# Patient Record
Sex: MALE | Race: WHITE | NOT HISPANIC OR LATINO | Employment: OTHER | URBAN - METROPOLITAN AREA
[De-identification: names, ages, dates, MRNs, and addresses within clinical notes are randomized per-mention and may not be internally consistent; named-entity substitution may affect disease eponyms.]

---

## 2019-10-10 ENCOUNTER — APPOINTMENT (OUTPATIENT)
Dept: GENERAL RADIOLOGY | Facility: HOSPITAL | Age: 79
End: 2019-10-10

## 2019-10-10 ENCOUNTER — APPOINTMENT (OUTPATIENT)
Dept: CT IMAGING | Facility: HOSPITAL | Age: 79
End: 2019-10-10

## 2019-10-10 ENCOUNTER — HOSPITAL ENCOUNTER (EMERGENCY)
Facility: HOSPITAL | Age: 79
Discharge: HOME | End: 2019-10-10
Attending: EMERGENCY MEDICINE | Admitting: EMERGENCY MEDICINE

## 2019-10-10 VITALS
DIASTOLIC BLOOD PRESSURE: 84 MMHG | TEMPERATURE: 98.4 F | HEART RATE: 65 BPM | SYSTOLIC BLOOD PRESSURE: 149 MMHG | OXYGEN SATURATION: 98 % | BODY MASS INDEX: 31.39 KG/M2 | WEIGHT: 200 LBS | HEIGHT: 67 IN | RESPIRATION RATE: 16 BRPM

## 2019-10-10 DIAGNOSIS — R53.1 WEAKNESS: ICD-10-CM

## 2019-10-10 DIAGNOSIS — E86.0 DEHYDRATION: Primary | ICD-10-CM

## 2019-10-10 LAB
ALBUMIN SERPL-MCNC: 4.4 G/DL (ref 3.5–5.2)
ALBUMIN/GLOB SERPL: 1.5 G/DL
ALP SERPL-CCNC: 70 U/L (ref 39–117)
ALT SERPL W P-5'-P-CCNC: 16 U/L (ref 1–41)
ANION GAP SERPL CALCULATED.3IONS-SCNC: 10 MMOL/L (ref 5–15)
AST SERPL-CCNC: 17 U/L (ref 1–40)
BASOPHILS # BLD AUTO: 0.08 10*3/MM3 (ref 0–0.2)
BASOPHILS NFR BLD AUTO: 0.8 % (ref 0–1.5)
BILIRUB SERPL-MCNC: 0.4 MG/DL (ref 0.2–1.2)
BILIRUB UR QL STRIP: NEGATIVE
BUN BLD-MCNC: 17 MG/DL (ref 8–23)
BUN/CREAT SERPL: 11.1 (ref 7–25)
CALCIUM SPEC-SCNC: 9.5 MG/DL (ref 8.6–10.5)
CHLORIDE SERPL-SCNC: 103 MMOL/L (ref 98–107)
CLARITY UR: CLEAR
CO2 SERPL-SCNC: 31 MMOL/L (ref 22–29)
COLOR UR: YELLOW
CREAT BLD-MCNC: 1.53 MG/DL (ref 0.76–1.27)
D DIMER PPP FEU-MCNC: 1.3 MCGFEU/ML (ref 0–0.56)
DEPRECATED RDW RBC AUTO: 43.2 FL (ref 37–54)
EOSINOPHIL # BLD AUTO: 0.46 10*3/MM3 (ref 0–0.4)
EOSINOPHIL NFR BLD AUTO: 4.6 % (ref 0.3–6.2)
ERYTHROCYTE [DISTWIDTH] IN BLOOD BY AUTOMATED COUNT: 13.5 % (ref 12.3–15.4)
GFR SERPL CREATININE-BSD FRML MDRD: 44 ML/MIN/1.73
GLOBULIN UR ELPH-MCNC: 3 GM/DL
GLUCOSE BLD-MCNC: 134 MG/DL (ref 65–99)
GLUCOSE UR STRIP-MCNC: NEGATIVE MG/DL
HCT VFR BLD AUTO: 46 % (ref 37.5–51)
HGB BLD-MCNC: 15 G/DL (ref 13–17.7)
HGB UR QL STRIP.AUTO: NEGATIVE
HOLD SPECIMEN: NORMAL
HOLD SPECIMEN: NORMAL
IMM GRANULOCYTES # BLD AUTO: 0.05 10*3/MM3 (ref 0–0.05)
IMM GRANULOCYTES NFR BLD AUTO: 0.5 % (ref 0–0.5)
KETONES UR QL STRIP: NEGATIVE
LEUKOCYTE ESTERASE UR QL STRIP.AUTO: NEGATIVE
LYMPHOCYTES # BLD AUTO: 1.09 10*3/MM3 (ref 0.7–3.1)
LYMPHOCYTES NFR BLD AUTO: 10.8 % (ref 19.6–45.3)
MCH RBC QN AUTO: 28.4 PG (ref 26.6–33)
MCHC RBC AUTO-ENTMCNC: 32.6 G/DL (ref 31.5–35.7)
MCV RBC AUTO: 87.1 FL (ref 79–97)
MONOCYTES # BLD AUTO: 0.65 10*3/MM3 (ref 0.1–0.9)
MONOCYTES NFR BLD AUTO: 6.5 % (ref 5–12)
NEUTROPHILS # BLD AUTO: 7.72 10*3/MM3 (ref 1.7–7)
NEUTROPHILS NFR BLD AUTO: 76.8 % (ref 42.7–76)
NITRITE UR QL STRIP: NEGATIVE
NRBC BLD AUTO-RTO: 0 /100 WBC (ref 0–0.2)
NT-PROBNP SERPL-MCNC: 358.8 PG/ML (ref 5–1800)
PH UR STRIP.AUTO: 5.5 [PH] (ref 5–8)
PLATELET # BLD AUTO: 217 10*3/MM3 (ref 140–450)
PMV BLD AUTO: 10.5 FL (ref 6–12)
POTASSIUM BLD-SCNC: 4 MMOL/L (ref 3.5–5.2)
PROT SERPL-MCNC: 7.4 G/DL (ref 6–8.5)
PROT UR QL STRIP: NEGATIVE
RBC # BLD AUTO: 5.28 10*6/MM3 (ref 4.14–5.8)
SODIUM BLD-SCNC: 144 MMOL/L (ref 136–145)
SP GR UR STRIP: 1.02 (ref 1–1.03)
TROPONIN T SERPL-MCNC: <0.01 NG/ML (ref 0–0.03)
TROPONIN T SERPL-MCNC: <0.01 NG/ML (ref 0–0.03)
UROBILINOGEN UR QL STRIP: NORMAL
WBC NRBC COR # BLD: 10.05 10*3/MM3 (ref 3.4–10.8)
WHOLE BLOOD HOLD SPECIMEN: NORMAL
WHOLE BLOOD HOLD SPECIMEN: NORMAL

## 2019-10-10 PROCEDURE — 81003 URINALYSIS AUTO W/O SCOPE: CPT | Performed by: NURSE PRACTITIONER

## 2019-10-10 PROCEDURE — 85379 FIBRIN DEGRADATION QUANT: CPT | Performed by: NURSE PRACTITIONER

## 2019-10-10 PROCEDURE — 99284 EMERGENCY DEPT VISIT MOD MDM: CPT

## 2019-10-10 PROCEDURE — 84484 ASSAY OF TROPONIN QUANT: CPT | Performed by: EMERGENCY MEDICINE

## 2019-10-10 PROCEDURE — 80053 COMPREHEN METABOLIC PANEL: CPT | Performed by: EMERGENCY MEDICINE

## 2019-10-10 PROCEDURE — 71045 X-RAY EXAM CHEST 1 VIEW: CPT

## 2019-10-10 PROCEDURE — 71275 CT ANGIOGRAPHY CHEST: CPT

## 2019-10-10 PROCEDURE — 74177 CT ABD & PELVIS W/CONTRAST: CPT

## 2019-10-10 PROCEDURE — 0 IOPAMIDOL PER 1 ML: Performed by: EMERGENCY MEDICINE

## 2019-10-10 PROCEDURE — 85025 COMPLETE CBC W/AUTO DIFF WBC: CPT | Performed by: EMERGENCY MEDICINE

## 2019-10-10 PROCEDURE — 83880 ASSAY OF NATRIURETIC PEPTIDE: CPT | Performed by: EMERGENCY MEDICINE

## 2019-10-10 PROCEDURE — 93005 ELECTROCARDIOGRAM TRACING: CPT | Performed by: EMERGENCY MEDICINE

## 2019-10-10 RX ORDER — SODIUM CHLORIDE 9 MG/ML
125 INJECTION, SOLUTION INTRAVENOUS CONTINUOUS
Status: DISCONTINUED | OUTPATIENT
Start: 2019-10-10 | End: 2019-10-10

## 2019-10-10 RX ORDER — SODIUM CHLORIDE 0.9 % (FLUSH) 0.9 %
10 SYRINGE (ML) INJECTION AS NEEDED
Status: DISCONTINUED | OUTPATIENT
Start: 2019-10-10 | End: 2019-10-10 | Stop reason: HOSPADM

## 2019-10-10 RX ADMIN — SODIUM CHLORIDE 1000 ML: 9 INJECTION, SOLUTION INTRAVENOUS at 16:48

## 2019-10-10 RX ADMIN — IOPAMIDOL 99 ML: 755 INJECTION, SOLUTION INTRAVENOUS at 19:08

## 2019-10-10 NOTE — ED PROVIDER NOTES
Subjective   Elver Montenegro is an 79 y.o. male who presents to the ED with complaints of generalized weakness. The patient reports that for the past 2 months, he has been experiencing fatigue, generalized weakness, slight shortness of breath, and decreased appetite. He also complains of allergy symptoms such as cough and rhinorrhea. The patient's daughter reports that at the patient's baseline he is very active and lives alone. He denies fever, chills, chest pain, difficulty urinating, abdominal pain, and leg swelling. He has a history of hypertension. There are no other acute complaints at this time.         History provided by:  Patient  Weakness - Generalized   Severity:  Moderate  Chronicity:  New  Associated symptoms: cough and shortness of breath    Associated symptoms: no abdominal pain, no chest pain and no fever        Review of Systems   Constitutional: Positive for appetite change and fatigue. Negative for chills and fever.   HENT: Positive for rhinorrhea.    Respiratory: Positive for cough and shortness of breath.    Cardiovascular: Negative for chest pain and leg swelling.   Gastrointestinal: Negative for abdominal pain.   Genitourinary: Negative for difficulty urinating.   Neurological: Positive for weakness.   All other systems reviewed and are negative.      Past Medical History:   Diagnosis Date   • Hypertension    • Stroke (CMS/HCC)        Allergies   Allergen Reactions   • Atenolol    • Avapro [Irbesartan]    • Clonidine Derivatives    • Lisinopril    • Nifedipine    • Tetanus Toxoids        Past Surgical History:   Procedure Laterality Date   • CHOLECYSTECTOMY     • JOINT REPLACEMENT         History reviewed. No pertinent family history.    Social History     Socioeconomic History   • Marital status:      Spouse name: Not on file   • Number of children: Not on file   • Years of education: Not on file   • Highest education level: Not on file   Tobacco Use   • Smoking status: Never Smoker    Substance and Sexual Activity   • Alcohol use: No   • Drug use: No   • Sexual activity: Defer         Objective   Physical Exam   Constitutional: He is oriented to person, place, and time. He appears well-developed and well-nourished. He is cooperative.  Non-toxic appearance. He does not appear ill.   HENT:   Head: Normocephalic and atraumatic.   Mouth/Throat: Oropharynx is clear and moist. No oropharyngeal exudate or posterior oropharyngeal edema.   Eyes: Conjunctivae, EOM and lids are normal. Pupils are equal, round, and reactive to light.   Neck: Trachea normal, normal range of motion and full passive range of motion without pain. Neck supple.   Cardiovascular: Regular rhythm, normal heart sounds, intact distal pulses and normal pulses.   Pulmonary/Chest: Effort normal and breath sounds normal. No respiratory distress. He has no decreased breath sounds. He has no wheezes. He has no rhonchi. He has no rales.   Abdominal: Soft. Normal appearance and bowel sounds are normal. There is no tenderness.   Musculoskeletal: Normal range of motion.        Right lower leg: Normal. He exhibits no tenderness and no edema.        Left lower leg: Normal. He exhibits no tenderness and no edema.   Neurological: He is alert and oriented to person, place, and time. He has normal strength. No cranial nerve deficit.   Skin: Skin is warm, dry and intact. No rash noted.   Psychiatric: He has a normal mood and affect. His speech is normal and behavior is normal.   Nursing note and vitals reviewed.      Procedures         ED Course  ED Course as of Oct 10 2110   Thu Oct 10, 2019   1636 D-Dimer, Quant: (!) 1.30 [KG]   1950 Patient is advised the results at this time.  Patient to increase his fluid intake.  Patient will be referred to the outpatient chest pain clinic.  Patient to follow-up with PCP.  Patient and daughter agree and verbalized understanding.  [KG]      ED Course User Index  [KG] Kacey Garnett, APRN       Recent Results  (from the past 24 hour(s))   Comprehensive Metabolic Panel    Collection Time: 10/10/19  3:04 PM   Result Value Ref Range    Glucose 134 (H) 65 - 99 mg/dL    BUN 17 8 - 23 mg/dL    Creatinine 1.53 (H) 0.76 - 1.27 mg/dL    Sodium 144 136 - 145 mmol/L    Potassium 4.0 3.5 - 5.2 mmol/L    Chloride 103 98 - 107 mmol/L    CO2 31.0 (H) 22.0 - 29.0 mmol/L    Calcium 9.5 8.6 - 10.5 mg/dL    Total Protein 7.4 6.0 - 8.5 g/dL    Albumin 4.40 3.50 - 5.20 g/dL    ALT (SGPT) 16 1 - 41 U/L    AST (SGOT) 17 1 - 40 U/L    Alkaline Phosphatase 70 39 - 117 U/L    Total Bilirubin 0.4 0.2 - 1.2 mg/dL    eGFR Non African Amer 44 (L) >60 mL/min/1.73    Globulin 3.0 gm/dL    A/G Ratio 1.5 g/dL    BUN/Creatinine Ratio 11.1 7.0 - 25.0    Anion Gap 10.0 5.0 - 15.0 mmol/L   BNP    Collection Time: 10/10/19  3:04 PM   Result Value Ref Range    proBNP 358.8 5.0-1,800.0 pg/mL   Troponin    Collection Time: 10/10/19  3:04 PM   Result Value Ref Range    Troponin T <0.010 0.000 - 0.030 ng/mL   Light Blue Top    Collection Time: 10/10/19  3:04 PM   Result Value Ref Range    Extra Tube hold for add-on    Green Top (Gel)    Collection Time: 10/10/19  3:04 PM   Result Value Ref Range    Extra Tube Hold for add-ons.    Lavender Top    Collection Time: 10/10/19  3:04 PM   Result Value Ref Range    Extra Tube hold for add-on    Gold Top - SST    Collection Time: 10/10/19  3:04 PM   Result Value Ref Range    Extra Tube Hold for add-ons.    CBC Auto Differential    Collection Time: 10/10/19  3:04 PM   Result Value Ref Range    WBC 10.05 3.40 - 10.80 10*3/mm3    RBC 5.28 4.14 - 5.80 10*6/mm3    Hemoglobin 15.0 13.0 - 17.7 g/dL    Hematocrit 46.0 37.5 - 51.0 %    MCV 87.1 79.0 - 97.0 fL    MCH 28.4 26.6 - 33.0 pg    MCHC 32.6 31.5 - 35.7 g/dL    RDW 13.5 12.3 - 15.4 %    RDW-SD 43.2 37.0 - 54.0 fl    MPV 10.5 6.0 - 12.0 fL    Platelets 217 140 - 450 10*3/mm3    Neutrophil % 76.8 (H) 42.7 - 76.0 %    Lymphocyte % 10.8 (L) 19.6 - 45.3 %    Monocyte % 6.5 5.0 -  12.0 %    Eosinophil % 4.6 0.3 - 6.2 %    Basophil % 0.8 0.0 - 1.5 %    Immature Grans % 0.5 0.0 - 0.5 %    Neutrophils, Absolute 7.72 (H) 1.70 - 7.00 10*3/mm3    Lymphocytes, Absolute 1.09 0.70 - 3.10 10*3/mm3    Monocytes, Absolute 0.65 0.10 - 0.90 10*3/mm3    Eosinophils, Absolute 0.46 (H) 0.00 - 0.40 10*3/mm3    Basophils, Absolute 0.08 0.00 - 0.20 10*3/mm3    Immature Grans, Absolute 0.05 0.00 - 0.05 10*3/mm3    nRBC 0.0 0.0 - 0.2 /100 WBC   D-dimer, Quantitative    Collection Time: 10/10/19  3:04 PM   Result Value Ref Range    D-Dimer, Quantitative 1.30 (H) 0.00 - 0.56 MCGFEU/mL   Urinalysis With Microscopic If Indicated (No Culture) - Urine, Clean Catch    Collection Time: 10/10/19  4:07 PM   Result Value Ref Range    Color, UA Yellow Yellow, Straw    Appearance, UA Clear Clear    pH, UA 5.5 5.0 - 8.0    Specific Gravity, UA 1.016 1.001 - 1.030    Glucose, UA Negative Negative    Ketones, UA Negative Negative    Bilirubin, UA Negative Negative    Blood, UA Negative Negative    Protein, UA Negative Negative    Leuk Esterase, UA Negative Negative    Nitrite, UA Negative Negative    Urobilinogen, UA 0.2 E.U./dL 0.2 - 1.0 E.U./dL   Troponin    Collection Time: 10/10/19  5:45 PM   Result Value Ref Range    Troponin T <0.010 0.000 - 0.030 ng/mL     Note: In addition to lab results from this visit, the labs listed above may include labs taken at another facility or during a different encounter within the last 24 hours. Please correlate lab times with ED admission and discharge times for further clarification of the services performed during this visit.    CT Abdomen Pelvis With Contrast   Final Result   Stable left inguinal hernia with fat extending only down to the scrotum      Left kidney is located in the pelvis      Otherwise normal               Signer Name: Aniceto Freed MD    Signed: 10/10/2019 7:26 PM    Workstation Name: [id number]     [facility]      CT Angiogram Chest With Contrast    Final Result   No CT evidence pulmonary embolus.      Well-circumscribed 8 mm nodule right upper lobe. Has 2 central calcifications. This is almost certainly a granuloma.      The left kidney is not visible in the upper abdomen      Otherwise normal      Signer Name: Aniceto Freed MD    Signed: 10/10/2019 7:22 PM    Workstation Name: [email or web address]     [facility]      XR Chest 1 View   Preliminary Result   Mildly low lung volumes with hypoventilatory findings   however no acute cardiopulmonary process otherwise noted; specifically   no overt edema or effusion.       DICTATED:   10/10/2019   EDITED/ls :   10/10/2019             Vitals:    10/10/19 1630 10/10/19 1830 10/10/19 1952 10/10/19 2014   BP: 134/80 134/78  149/84   BP Location:    Right arm   Patient Position:    Lying   Pulse: 65 64 64 65   Resp:    16   Temp:    98.4 °F (36.9 °C)   TempSrc:    Oral   SpO2: 98% 97% 98% 98%   Weight:       Height:         Medications   sodium chloride 0.9 % flush 10 mL (not administered)   sodium chloride 0.9 % flush 10 mL (not administered)   sodium chloride 0.9 % flush 10 mL (not administered)   sodium chloride 0.9 % bolus 1,000 mL (0 mL Intravenous Stopped 10/10/19 1718)   iopamidol (ISOVUE-370) 76 % injection 150 mL (99 mL Intravenous Given 10/10/19 1908)     ECG/EMG Results (last 24 hours)     Procedure Component Value Units Date/Time    ECG 12 Lead [[id number]] Collected:  10/10/19 1425     Updated:  10/10/19 1435        ECG 12 Lead         ECG 12 Lead                           MDM    Final diagnoses:   Dehydration   Weakness       Documentation assistance provided by carissa Perla.  Information recorded by the carissa was done at my direction and has been verified and validated by me.     Dolores Perla  10/10/19 [id number]       Kacey Garnett APRN  10/10/19 2760

## 2019-10-16 ENCOUNTER — OFFICE VISIT (OUTPATIENT)
Dept: CARDIOLOGY | Facility: HOSPITAL | Age: 79
End: 2019-10-16

## 2019-10-16 ENCOUNTER — HOSPITAL ENCOUNTER (OUTPATIENT)
Dept: CARDIOLOGY | Facility: HOSPITAL | Age: 79
Discharge: HOME | End: 2019-10-16
Admitting: NURSE PRACTITIONER

## 2019-10-16 VITALS
DIASTOLIC BLOOD PRESSURE: 88 MMHG | WEIGHT: 194 LBS | BODY MASS INDEX: 32.32 KG/M2 | SYSTOLIC BLOOD PRESSURE: 154 MMHG | HEIGHT: 65 IN

## 2019-10-16 VITALS
HEIGHT: 67 IN | HEART RATE: 70 BPM | WEIGHT: 194.5 LBS | DIASTOLIC BLOOD PRESSURE: 77 MMHG | OXYGEN SATURATION: 97 % | BODY MASS INDEX: 30.53 KG/M2 | SYSTOLIC BLOOD PRESSURE: 154 MMHG | TEMPERATURE: 97.8 F | RESPIRATION RATE: 16 BRPM

## 2019-10-16 DIAGNOSIS — I10 ESSENTIAL HYPERTENSION: ICD-10-CM

## 2019-10-16 DIAGNOSIS — R00.2 PALPITATIONS: ICD-10-CM

## 2019-10-16 DIAGNOSIS — R53.83 OTHER FATIGUE: ICD-10-CM

## 2019-10-16 DIAGNOSIS — R07.89 SENSATION OF CHEST TIGHTNESS: ICD-10-CM

## 2019-10-16 DIAGNOSIS — R06.09 DYSPNEA ON EXERTION: ICD-10-CM

## 2019-10-16 DIAGNOSIS — R06.09 DYSPNEA ON EXERTION: Primary | ICD-10-CM

## 2019-10-16 LAB
BH CV ECHO MEAS - AO MAX PG (FULL): 1.8 MMHG
BH CV ECHO MEAS - AO MAX PG: 6 MMHG
BH CV ECHO MEAS - AO MEAN PG (FULL): 1 MMHG
BH CV ECHO MEAS - AO MEAN PG: 3 MMHG
BH CV ECHO MEAS - AO ROOT AREA (BSA CORRECTED): 1.7
BH CV ECHO MEAS - AO ROOT AREA: 9.1 CM^2
BH CV ECHO MEAS - AO ROOT DIAM: 3.4 CM
BH CV ECHO MEAS - AO V2 MAX: 126 CM/SEC
BH CV ECHO MEAS - AO V2 MEAN: 82.6 CM/SEC
BH CV ECHO MEAS - AO V2 VTI: 26.7 CM
BH CV ECHO MEAS - ASC AORTA: 3.6 CM
BH CV ECHO MEAS - AVA(I,A): 2.7 CM^2
BH CV ECHO MEAS - AVA(I,D): 2.7 CM^2
BH CV ECHO MEAS - AVA(V,A): 3.1 CM^2
BH CV ECHO MEAS - AVA(V,D): 3.1 CM^2
BH CV ECHO MEAS - BSA(HAYCOCK): 2.1 M^2
BH CV ECHO MEAS - BSA: 2 M^2
BH CV ECHO MEAS - BZI_BMI: 31.3 KILOGRAMS/M^2
BH CV ECHO MEAS - BZI_METRIC_HEIGHT: 167.6 CM
BH CV ECHO MEAS - BZI_METRIC_WEIGHT: 88 KG
BH CV ECHO MEAS - EDV(CUBED): 59.3 ML
BH CV ECHO MEAS - EDV(MOD-SP2): 78.6 ML
BH CV ECHO MEAS - EDV(MOD-SP4): 74.3 ML
BH CV ECHO MEAS - EDV(TEICH): 65.9 ML
BH CV ECHO MEAS - EF(CUBED): 79.5 %
BH CV ECHO MEAS - EF(MOD-BP): 62 %
BH CV ECHO MEAS - EF(MOD-SP2): 59.4 %
BH CV ECHO MEAS - EF(MOD-SP4): 78.2 %
BH CV ECHO MEAS - EF(TEICH): 72.5 %
BH CV ECHO MEAS - ESV(CUBED): 12.2 ML
BH CV ECHO MEAS - ESV(MOD-SP2): 31.9 ML
BH CV ECHO MEAS - ESV(MOD-SP4): 16.2 ML
BH CV ECHO MEAS - ESV(TEICH): 18.1 ML
BH CV ECHO MEAS - FS: 41 %
BH CV ECHO MEAS - IVS/LVPW: 1
BH CV ECHO MEAS - IVSD: 1.4 CM
BH CV ECHO MEAS - LA DIMENSION: 4.2 CM
BH CV ECHO MEAS - LA/AO: 1.2
BH CV ECHO MEAS - LAD MAJOR: 6.9 CM
BH CV ECHO MEAS - LAT PEAK E' VEL: 7.9 CM/SEC
BH CV ECHO MEAS - LATERAL E/E' RATIO: 8.2
BH CV ECHO MEAS - LV DIASTOLIC VOL/BSA (35-75): 37.6 ML/M^2
BH CV ECHO MEAS - LV MASS(C)D: 201.5 GRAMS
BH CV ECHO MEAS - LV MASS(C)DI: 102 GRAMS/M^2
BH CV ECHO MEAS - LV MAX PG: 4.2 MMHG
BH CV ECHO MEAS - LV MEAN PG: 2 MMHG
BH CV ECHO MEAS - LV SYSTOLIC VOL/BSA (12-30): 8.2 ML/M^2
BH CV ECHO MEAS - LV V1 MAX: 102 CM/SEC
BH CV ECHO MEAS - LV V1 MEAN: 62.3 CM/SEC
BH CV ECHO MEAS - LV V1 VTI: 19.1 CM
BH CV ECHO MEAS - LVIDD: 3.9 CM
BH CV ECHO MEAS - LVIDS: 2.3 CM
BH CV ECHO MEAS - LVLD AP2: 7.8 CM
BH CV ECHO MEAS - LVLD AP4: 7.3 CM
BH CV ECHO MEAS - LVLS AP2: 6.2 CM
BH CV ECHO MEAS - LVLS AP4: 5.5 CM
BH CV ECHO MEAS - LVOT AREA (M): 3.8 CM^2
BH CV ECHO MEAS - LVOT AREA: 3.8 CM^2
BH CV ECHO MEAS - LVOT DIAM: 2.2 CM
BH CV ECHO MEAS - LVPWD: 1.4 CM
BH CV ECHO MEAS - MED PEAK E' VEL: 3.9 CM/SEC
BH CV ECHO MEAS - MEDIAL E/E' RATIO: 16.6
BH CV ECHO MEAS - MV A MAX VEL: 47.1 CM/SEC
BH CV ECHO MEAS - MV DEC SLOPE: 303 CM/SEC^2
BH CV ECHO MEAS - MV DEC TIME: 0.22 SEC
BH CV ECHO MEAS - MV E MAX VEL: 65.1 CM/SEC
BH CV ECHO MEAS - MV E/A: 1.4
BH CV ECHO MEAS - MV MAX PG: 1.5 MMHG
BH CV ECHO MEAS - MV MEAN PG: 1 MMHG
BH CV ECHO MEAS - MV V2 MAX: 60.3 CM/SEC
BH CV ECHO MEAS - MV V2 MEAN: 39.1 CM/SEC
BH CV ECHO MEAS - MV V2 VTI: 23.7 CM
BH CV ECHO MEAS - MVA(VTI): 3.1 CM^2
BH CV ECHO MEAS - PA ACC TIME: 0.12 SEC
BH CV ECHO MEAS - PA MAX PG: 5.3 MMHG
BH CV ECHO MEAS - PA PR(ACCEL): 23.7 MMHG
BH CV ECHO MEAS - PA V2 MAX: 115 CM/SEC
BH CV ECHO MEAS - PI END-D VEL: 76 CM/SEC
BH CV ECHO MEAS - RAP SYSTOLE: 8 MMHG
BH CV ECHO MEAS - RVSP: 37 MMHG
BH CV ECHO MEAS - SI(AO): 122.8 ML/M^2
BH CV ECHO MEAS - SI(CUBED): 23.9 ML/M^2
BH CV ECHO MEAS - SI(LVOT): 36.8 ML/M^2
BH CV ECHO MEAS - SI(MOD-SP2): 23.7 ML/M^2
BH CV ECHO MEAS - SI(MOD-SP4): 29.4 ML/M^2
BH CV ECHO MEAS - SI(TEICH): 24.2 ML/M^2
BH CV ECHO MEAS - SV(AO): 242.4 ML
BH CV ECHO MEAS - SV(CUBED): 47.2 ML
BH CV ECHO MEAS - SV(LVOT): 72.6 ML
BH CV ECHO MEAS - SV(MOD-SP2): 46.7 ML
BH CV ECHO MEAS - SV(MOD-SP4): 58.1 ML
BH CV ECHO MEAS - SV(TEICH): 47.8 ML
BH CV ECHO MEAS - TAPSE (>1.6): 1.75 CM2
BH CV ECHO MEAS - TR MAX PG: 29 MMHG
BH CV ECHO MEAS - TR MAX VEL: 269 CM/SEC
BH CV ECHO MEASUREMENTS AVERAGE E/E' RATIO: 11.03
BH CV VAS BP RIGHT ARM: NORMAL MMHG
BH CV XLRA - RV BASE: 3.9 CM
BH CV XLRA - RV LENGTH: 5.1 CM
BH CV XLRA - RV MID: 3.1 CM
BH CV XLRA - TDI S': 16.4 CM/SEC
LEFT ATRIUM VOLUME INDEX: 36 ML/M^2
LEFT ATRIUM VOLUME: 71.1 ML
MAXIMAL PREDICTED HEART RATE: 139 BPM
STRESS TARGET HR: 118 BPM

## 2019-10-16 PROCEDURE — 99204 OFFICE O/P NEW MOD 45 MIN: CPT | Performed by: NURSE PRACTITIONER

## 2019-10-16 PROCEDURE — 0296T HC EXT ECG > 48HR TO 21 DAY RCRD W/CONECT INTL RCRD: CPT

## 2019-10-16 PROCEDURE — 93306 TTE W/DOPPLER COMPLETE: CPT | Performed by: INTERNAL MEDICINE

## 2019-10-16 PROCEDURE — 93306 TTE W/DOPPLER COMPLETE: CPT

## 2019-10-16 RX ORDER — TRIAMTERENE AND HYDROCHLOROTHIAZIDE 75; 50 MG/1; MG/1
1 TABLET ORAL DAILY
COMMUNITY
End: 2020-12-07 | Stop reason: SDUPTHER

## 2019-10-16 NOTE — PROGRESS NOTES
[facility]      Encounter Date:10/16/2019     Elver Montenegro  [address] [place] [address]  [unfilled]    1940    Provider, No Known    Elver Montenegro is a 79 y.o. male.      Subjective:     Chief Complaint:  Establish Care and Fatigue       HPI pt presents to the office today for ongoing evaluation of his generalized weakness for the past 2 months. He also notes dyspnea on exertion that improves with rest and mild chest tightness. Chest tightness worsens with exertion and improves with rest. He notes that he wakes up tired in the morning and then falls asleep multiple times throughout the day. This is all new for him  Because he is quite active. His daughter notes that his bp at home is usually 130-140s. Notes intermittent palpitations.    Patient Active Problem List   Diagnosis   • Senile hyperkeratosis   • Stroke (CMS/HCC)   • Hypertension   • Dyspnea on exertion   • Palpitations   • Other fatigue   • Sensation of chest tightness       Past Medical History:   Diagnosis Date   • Hypertension    • Stroke (CMS/HCC)        Past Surgical History:   Procedure Laterality Date   • CHOLECYSTECTOMY     • JOINT REPLACEMENT         Family History   Problem Relation Age of Onset   • Heart disease Mother    • Cancer Mother    • Aneurysm Father        Social History     Socioeconomic History   • Marital status:      Spouse name: Not on file   • Number of children: Not on file   • Years of education: Not on file   • Highest education level: Not on file   Tobacco Use   • Smoking status: Never Smoker   • Smokeless tobacco: Never Used   Substance and Sexual Activity   • Alcohol use: No   • Drug use: No   • Sexual activity: Defer   Social History Narrative    caffeine use: rarely       Allergies   Allergen Reactions   • Atenolol    • Avapro [Irbesartan]    • Clonidine Derivatives    • Lisinopril    • Nifedipine    • Tetanus Toxoids          Current Outpatient Medications:   •   aspirin 81 MG tablet, Take 81 mg by mouth Daily., Disp: , Rfl:   •  doxazosin (CARDURA) 8 MG tablet, Take 8 mg by mouth Every Night., Disp: , Rfl:   •  ondansetron (ZOFRAN) 4 MG tablet, Take 1 tablet by mouth Every 6 (Six) Hours As Needed for vomiting., Disp: 12 tablet, Rfl: 0  •  triamterene-hydrochlorothiazide (MAXZIDE) 75-50 MG per tablet, Take 1 tablet by mouth Daily., Disp: , Rfl:     The following portions of the patient's history were reviewed today and updated as appropriate: allergies, current medications, past family history, past medical history, past social history, past surgical history and problem list     Review of Systems   Constitution: Positive for weakness and malaise/fatigue. Negative for chills, decreased appetite, diaphoresis, fever, night sweats, weight gain and weight loss.   HENT: Positive for hearing loss. Negative for congestion, hoarse voice and nosebleeds.    Eyes: Negative for blurred vision, visual disturbance and visual halos.   Cardiovascular: Positive for chest pain, dyspnea on exertion, irregular heartbeat and orthopnea. Negative for claudication, cyanosis, leg swelling, near-syncope, palpitations, paroxysmal nocturnal dyspnea and syncope.   Respiratory: Positive for cough, shortness of breath and snoring. Negative for hemoptysis, sleep disturbances due to breathing, sputum production and wheezing.    Endocrine: Positive for heat intolerance.   Hematologic/Lymphatic: Negative for bleeding problem. Does not bruise/bleed easily.   Skin: Positive for itching. Negative for dry skin and rash.   Musculoskeletal: Positive for joint pain. Negative for arthritis, falls, joint swelling and myalgias.   Gastrointestinal: Negative for bloating, abdominal pain, constipation, diarrhea, flatus, heartburn, hematemesis, hematochezia, melena, nausea and vomiting.   Genitourinary: Negative for dysuria, frequency, hematuria, nocturia and urgency.   Neurological: Negative for excessive daytime  "sleepiness, dizziness, headaches, light-headedness and loss of balance.   Psychiatric/Behavioral: Positive for memory loss. Negative for depression. The patient is nervous/anxious. The patient does not have insomnia.        Objective:     Vitals:    10/16/19 0909 10/16/19 0911 10/16/19 0912   BP: 162/80 152/79 154/77   BP Location: Right arm Left arm Left arm   Patient Position: Sitting Sitting Standing   Cuff Size: Adult Adult Adult   Pulse: 70  70   Resp: 16     Temp: 97.8 °F (36.6 °C)     TempSrc: Temporal     SpO2: 96%  97%   Weight: 88.2 kg (194 lb 8 oz)     Height: 168.9 cm (66.5\")         Physical Exam   Constitutional: He is oriented to person, place, and time. He appears well-developed and well-nourished. He is active and cooperative. No distress.   HENT:   Head: Normocephalic and atraumatic.   Mouth/Throat: Oropharynx is clear and moist.   Eyes: Conjunctivae and EOM are normal. Pupils are equal, round, and reactive to light.   Neck: Normal range of motion. Neck supple. No JVD present. No tracheal deviation present. No thyromegaly present.   Cardiovascular: Normal rate, regular rhythm, normal heart sounds and intact distal pulses.   Pulmonary/Chest: Effort normal and breath sounds normal.   Abdominal: Soft. Bowel sounds are normal. He exhibits no distension. There is no tenderness.   Musculoskeletal: Normal range of motion.   Neurological: He is alert and oriented to person, place, and time.   Skin: Skin is warm, dry and intact.   Psychiatric: He has a normal mood and affect. His behavior is normal.   Nursing note and vitals reviewed.      Lab and Diagnostic Review:  Results for orders placed or performed during the hospital encounter of 10/10/19   Comprehensive Metabolic Panel   Result Value Ref Range    Glucose 134 (H) 65 - 99 mg/dL    BUN 17 8 - 23 mg/dL    Creatinine 1.53 (H) 0.76 - 1.27 mg/dL    Sodium 144 136 - 145 mmol/L    Potassium 4.0 3.5 - 5.2 mmol/L    Chloride 103 98 - 107 mmol/L    CO2 31.0 " (H) 22.0 - 29.0 mmol/L    Calcium 9.5 8.6 - 10.5 mg/dL    Total Protein 7.4 6.0 - 8.5 g/dL    Albumin 4.40 3.50 - 5.20 g/dL    ALT (SGPT) 16 1 - 41 U/L    AST (SGOT) 17 1 - 40 U/L    Alkaline Phosphatase 70 39 - 117 U/L    Total Bilirubin 0.4 0.2 - 1.2 mg/dL    eGFR Non African Amer 44 (L) >60 mL/min/1.73    Globulin 3.0 gm/dL    A/G Ratio 1.5 g/dL    BUN/Creatinine Ratio 11.1 7.0 - 25.0    Anion Gap 10.0 5.0 - 15.0 mmol/L   BNP   Result Value Ref Range    proBNP 358.8 5.0-1,800.0 pg/mL   Troponin   Result Value Ref Range    Troponin T <0.010 0.000 - 0.030 ng/mL   CBC Auto Differential   Result Value Ref Range    WBC 10.05 3.40 - 10.80 10*3/mm3    RBC 5.28 4.14 - 5.80 10*6/mm3    Hemoglobin 15.0 13.0 - 17.7 g/dL    Hematocrit 46.0 37.5 - 51.0 %    MCV 87.1 79.0 - 97.0 fL    MCH 28.4 26.6 - 33.0 pg    MCHC 32.6 31.5 - 35.7 g/dL    RDW 13.5 12.3 - 15.4 %    RDW-SD 43.2 37.0 - 54.0 fl    MPV 10.5 6.0 - 12.0 fL    Platelets 217 140 - 450 10*3/mm3    Neutrophil % 76.8 (H) 42.7 - 76.0 %    Lymphocyte % 10.8 (L) 19.6 - 45.3 %    Monocyte % 6.5 5.0 - 12.0 %    Eosinophil % 4.6 0.3 - 6.2 %    Basophil % 0.8 0.0 - 1.5 %    Immature Grans % 0.5 0.0 - 0.5 %    Neutrophils, Absolute 7.72 (H) 1.70 - 7.00 10*3/mm3    Lymphocytes, Absolute 1.09 0.70 - 3.10 10*3/mm3    Monocytes, Absolute 0.65 0.10 - 0.90 10*3/mm3    Eosinophils, Absolute 0.46 (H) 0.00 - 0.40 10*3/mm3    Basophils, Absolute 0.08 0.00 - 0.20 10*3/mm3    Immature Grans, Absolute 0.05 0.00 - 0.05 10*3/mm3    nRBC 0.0 0.0 - 0.2 /100 WBC   Urinalysis With Microscopic If Indicated (No Culture) - Urine, Clean Catch   Result Value Ref Range    Color, UA Yellow Yellow, Straw    Appearance, UA Clear Clear    pH, UA 5.5 5.0 - 8.0    Specific Gravity, UA 1.016 1.001 - 1.030    Glucose, UA Negative Negative    Ketones, UA Negative Negative    Bilirubin, UA Negative Negative    Blood, UA Negative Negative    Protein, UA Negative Negative    Leuk Esterase, UA Negative Negative     Nitrite, UA Negative Negative    Urobilinogen, UA 0.2 E.U./dL 0.2 - 1.0 E.U./dL   D-dimer, Quantitative   Result Value Ref Range    D-Dimer, Quantitative 1.30 (H) 0.00 - 0.56 MCGFEU/mL   Troponin   Result Value Ref Range    Troponin T <0.010 0.000 - 0.030 ng/mL     EKG: Sinus rhythm with premature supraventricular complexes  Otherwise normal ECG  When compared with ECG of 28-MAY-2016 11:05,  premature ventricular complexes are no longer present  premature supraventricular complexes are now present  Confirmed by MD Mckeon Michael ([id number]) on 10/10/2019 10:02:36 PM  EKG 2nd set: NSR   Assessment and Plan:   1. Dyspnea on exertion    - Adult Transthoracic Echo Complete W/ Cont if Necessary Per Protocol; Future  - Stress Test With Pet Myocardial Perfusion (Multi Study); Future    2. Essential hypertension  HTN Education provided today including signs and symptoms, medication management, daily blood pressure monitoring. Patient encouraged to call the Heart and Valve center with any abnormal readings.   - Adult Transthoracic Echo Complete W/ Cont if Necessary Per Protocol; Future  - Stress Test With Pet Myocardial Perfusion (Multi Study); Future    3. Palpitations    - Adult Transthoracic Echo Complete W/ Cont if Necessary Per Protocol; Future  - Holter Monitor - 72 Hour Up To 21 Days; Future    4. Other fatigue    - Stress Test With Pet Myocardial Perfusion (Multi Study); Future    5. Sensation of chest tightness    PET       It has been a pleasure to participate in the care of this patient.  Patient was instructed to call the [facility] with any questions, concerns, or worsening symptoms.    *Please note that portions of this note were completed with a voice recognition program. Efforts were made to edit the dictations, but occasionally words are mistranscribed.    [facility] Heart Monitor Documentation    Elver Montenegro  1940  [id number]  10/20/19    LILY Rojas    [] ZIO XT Patch  Model [id number]  Prescribed for N/A Days    · Serial Number: (N + 9 Digits) N   · Apply-By Date on Box:   · USPS Tracking Number:   · USPS Tracking        [] [employer]ian MINI PLUS Mobile Cardiac Telemetry  Model BGMINIPLUS Prescribed for N/A Days    · Serial Number: (BGM + 7 Digits) BGM  · Shipped-By Date on Box:   · UPS Tracking Number: [id number]  · UPS Tracking      [x] [employer] MINI Holter Monitor  Model BGMINIEL Prescribed for 14 Days    · Serial Number: (7 Digits) [id number]  · Shipped-By Date on Box: 10/10/2019  · UPS Tracking Number: [id number]  · UPS Tracking        This monitor was applied to the patient's chest and checked for proper functioning.  Mr. Elver Montenegro was instructed in the proper use of this monitor.  He was given the opportunity to ask questions and left the office with the device 's instruction manual.    LILY Chou, 10:16 AM, 10/20/19                  [facility] 8.8.2019

## 2019-10-18 ENCOUNTER — TELEPHONE (OUTPATIENT)
Dept: CARDIOLOGY | Facility: HOSPITAL | Age: 79
End: 2019-10-18

## 2019-10-18 NOTE — TELEPHONE ENCOUNTER
Reviewed echo with patient's daughter. Patient to continue wearing his extended holter and will plan for stress test in near future.

## 2019-10-20 PROBLEM — R06.09 DYSPNEA ON EXERTION: Status: ACTIVE | Noted: 2019-10-20

## 2019-10-20 PROBLEM — R00.2 PALPITATIONS: Status: ACTIVE | Noted: 2019-10-20

## 2019-10-20 PROBLEM — R07.89 SENSATION OF CHEST TIGHTNESS: Status: ACTIVE | Noted: 2019-10-20

## 2019-10-20 PROBLEM — I63.9 STROKE: Status: ACTIVE | Noted: 2019-10-20

## 2019-10-20 PROBLEM — I10 HYPERTENSION: Status: ACTIVE | Noted: 2019-10-20

## 2019-10-20 PROBLEM — R53.83 OTHER FATIGUE: Status: ACTIVE | Noted: 2019-10-20

## 2019-10-23 ENCOUNTER — HOSPITAL ENCOUNTER (OUTPATIENT)
Dept: CARDIOLOGY | Facility: HOSPITAL | Age: 79
Discharge: HOME | End: 2019-10-23

## 2019-10-23 DIAGNOSIS — R53.83 OTHER FATIGUE: ICD-10-CM

## 2019-10-23 DIAGNOSIS — R06.09 DYSPNEA ON EXERTION: ICD-10-CM

## 2019-10-23 DIAGNOSIS — I10 ESSENTIAL HYPERTENSION: ICD-10-CM

## 2019-10-23 DIAGNOSIS — R07.89 SENSATION OF CHEST TIGHTNESS: ICD-10-CM

## 2019-10-23 LAB
BH CV STRESS BP STAGE 1: NORMAL
BH CV STRESS BP STAGE 3: NORMAL
BH CV STRESS COMMENTS STAGE 1: NORMAL
BH CV STRESS DOSE REGADENOSON STAGE 1: 0.4
BH CV STRESS DURATION MIN STAGE 1: 1
BH CV STRESS DURATION MIN STAGE 2: 1
BH CV STRESS DURATION MIN STAGE 3: 1
BH CV STRESS DURATION MIN STAGE 4: 1
BH CV STRESS DURATION SEC STAGE 1: 10
BH CV STRESS DURATION SEC STAGE 2: 0
BH CV STRESS HR STAGE 1: 82
BH CV STRESS HR STAGE 2: 93
BH CV STRESS HR STAGE 3: 88
BH CV STRESS HR STAGE 4: 84
BH CV STRESS PROTOCOL 1: NORMAL
BH CV STRESS RECOVERY BP: NORMAL MMHG
BH CV STRESS RECOVERY HR: 82 BPM
BH CV STRESS STAGE 1: 1
BH CV STRESS STAGE 2: 2
BH CV STRESS STAGE 3: 3
BH CV STRESS STAGE 4: 4
MAXIMAL PREDICTED HEART RATE: 139 BPM
PERCENT MAX PREDICTED HR: 66.91 %
STRESS BASELINE BP: NORMAL MMHG
STRESS BASELINE HR: 68 BPM
STRESS PERCENT HR: 79 %
STRESS POST PEAK BP: NORMAL MMHG
STRESS POST PEAK HR: 93 BPM
STRESS TARGET HR: 118 BPM

## 2019-10-23 PROCEDURE — 78492 MYOCRD IMG PET MLT RST&STRS: CPT | Performed by: INTERNAL MEDICINE

## 2019-10-23 PROCEDURE — 78492 MYOCRD IMG PET MLT RST&STRS: CPT

## 2019-10-23 PROCEDURE — 0 RUBIDIUM CHLORIDE: Performed by: NURSE PRACTITIONER

## 2019-10-23 PROCEDURE — 25010000002 REGADENOSON 0.4 MG/5ML SOLUTION: Performed by: NURSE PRACTITIONER

## 2019-10-23 PROCEDURE — 93018 CV STRESS TEST I&R ONLY: CPT | Performed by: INTERNAL MEDICINE

## 2019-10-23 PROCEDURE — 93017 CV STRESS TEST TRACING ONLY: CPT

## 2019-10-23 PROCEDURE — A9555 RB82 RUBIDIUM: HCPCS | Performed by: NURSE PRACTITIONER

## 2019-10-23 RX ADMIN — RUBIDIUM CHLORIDE RB-82 1 DOSE: 150 INJECTION, SOLUTION INTRAVENOUS at 14:19

## 2019-10-23 RX ADMIN — REGADENOSON 0.4 MG: 0.08 INJECTION, SOLUTION INTRAVENOUS at 14:29

## 2019-10-23 RX ADMIN — RUBIDIUM CHLORIDE RB-82 1 DOSE: 150 INJECTION, SOLUTION INTRAVENOUS at 14:30

## 2019-10-24 ENCOUNTER — TELEPHONE (OUTPATIENT)
Dept: CARDIOLOGY | Facility: HOSPITAL | Age: 79
End: 2019-10-24

## 2019-10-24 NOTE — TELEPHONE ENCOUNTER
Reviewed stress test results with patient's daughter, Asia. Patient to continue wearing heart monitor and will follow up with Cardiology after monitor results.

## 2019-10-28 ENCOUNTER — TELEPHONE (OUTPATIENT)
Dept: CARDIOLOGY | Facility: HOSPITAL | Age: 79
End: 2019-10-28

## 2019-10-28 NOTE — TELEPHONE ENCOUNTER
Patient's daughter called to clarify the actual date to remove the monitor. Told daughter that it was placed on 10/16/19 and to remove the monitor on 10/30/19. Daughter had no further questions.

## 2019-10-30 PROCEDURE — 0298T HOLTER MONITOR - 72 HOUR UP TO 21 DAY: CPT | Performed by: INTERNAL MEDICINE

## 2019-11-13 ENCOUNTER — OFFICE VISIT (OUTPATIENT)
Dept: CARDIOLOGY | Facility: HOSPITAL | Age: 79
End: 2019-11-13

## 2019-11-13 VITALS
WEIGHT: 195 LBS | DIASTOLIC BLOOD PRESSURE: 80 MMHG | BODY MASS INDEX: 32.49 KG/M2 | HEIGHT: 65 IN | OXYGEN SATURATION: 96 % | RESPIRATION RATE: 18 BRPM | SYSTOLIC BLOOD PRESSURE: 145 MMHG | TEMPERATURE: 97.7 F | HEART RATE: 71 BPM

## 2019-11-13 DIAGNOSIS — I49.3 PVC'S (PREMATURE VENTRICULAR CONTRACTIONS): Primary | ICD-10-CM

## 2019-11-13 DIAGNOSIS — R06.09 DYSPNEA ON EXERTION: ICD-10-CM

## 2019-11-13 DIAGNOSIS — I47.29 NSVT (NONSUSTAINED VENTRICULAR TACHYCARDIA) (HCC): ICD-10-CM

## 2019-11-13 DIAGNOSIS — I10 ESSENTIAL HYPERTENSION: ICD-10-CM

## 2019-11-13 PROCEDURE — 99214 OFFICE O/P EST MOD 30 MIN: CPT | Performed by: NURSE PRACTITIONER

## 2019-11-13 RX ORDER — METOPROLOL SUCCINATE 25 MG/1
TABLET, EXTENDED RELEASE ORAL
Qty: 30 TABLET | Refills: 0 | Status: SHIPPED | OUTPATIENT
Start: 2019-11-13 | End: 2019-12-06

## 2019-11-13 NOTE — PROGRESS NOTES
[facility]  Heart and Valve Center      Encounter Date:11/13/2019     Elver Montenegro  [address] [place] [address]  [unfilled]    1940    Provider, No Known    Elver Montenegro is a 79 y.o. male.      Subjective:     Chief Complaint:  Follow-up and Shortness of Breath       HPI 79-year-old male presents the office today for ongoing evaluation of his dyspnea and palpitations.  He recently underwent an echo that showed an EF of 60% with mild to moderate hypertrophy.  Grade 2 diastolic dysfunction with pseudonormalization.  Trace to mild TR, mild MR.  PET scan 10/23/2019 showed a small size mild intense city area of inferior apical ischemia with moderate amount of coronary artery calcification predominantly in the LAD.  Read per Dr. Sullivan is low risk due to the small amount of ischemia detected.   He notes an odd sensation in his chest ongoing which he describes as an uneasiness in his chest.  He does deny chest pain but does report ongoing dyspnea on exertion.    Patient Active Problem List   Diagnosis   • Senile hyperkeratosis   • Stroke (CMS/HCC)   • Hypertension   • Dyspnea on exertion   • Palpitations   • Other fatigue   • Sensation of chest tightness       Past Medical History:   Diagnosis Date   • Hypertension    • Stroke (CMS/HCC)        Past Surgical History:   Procedure Laterality Date   • CHOLECYSTECTOMY     • JOINT REPLACEMENT         Family History   Problem Relation Age of Onset   • Heart disease Mother    • Cancer Mother    • Aneurysm Father        Social History     Socioeconomic History   • Marital status:      Spouse name: Not on file   • Number of children: Not on file   • Years of education: Not on file   • Highest education level: Not on file   Tobacco Use   • Smoking status: Never Smoker   • Smokeless tobacco: Never Used   Substance and Sexual Activity   • Alcohol use: No   • Drug use: No   • Sexual activity: Defer   Social History Narrative    caffeine use: rarely        Allergies   Allergen Reactions   • Clonidine Hcl Shortness Of Breath   • Atenolol Unknown (See Comments)     Patient unclear of reaction   • Clonidine Derivatives    • Irbesartan Swelling   • Lisinopril    • Nifedipine    • Tetanus Toxoid GI Intolerance   • Tetanus Toxoids          Current Outpatient Medications:   •  aspirin 81 MG tablet, Take 81 mg by mouth Daily., Disp: , Rfl:   •  doxazosin (CARDURA) 8 MG tablet, Take 8 mg by mouth Every Night., Disp: , Rfl:   •  ondansetron (ZOFRAN) 4 MG tablet, Take 1 tablet by mouth Every 6 (Six) Hours As Needed for vomiting., Disp: 12 tablet, Rfl: 0  •  triamterene-hydrochlorothiazide (MAXZIDE) 75-50 MG per tablet, Take 1 tablet by mouth Daily., Disp: , Rfl:       The following portions of the patient's history were reviewed today and updated as appropriate: allergies, current medications, past family history, past medical history, past social history, past surgical history and problem list     Review of Systems   Constitution: Negative for chills, decreased appetite, diaphoresis, fever, weakness, malaise/fatigue, night sweats, weight gain and weight loss.   HENT: Negative for congestion, hearing loss, hoarse voice and nosebleeds.    Eyes: Negative for blurred vision, visual disturbance and visual halos.   Cardiovascular: Positive for dyspnea on exertion and irregular heartbeat. Negative for chest pain, claudication, cyanosis, leg swelling, near-syncope, orthopnea, palpitations, paroxysmal nocturnal dyspnea and syncope.   Respiratory: Negative for cough, hemoptysis, shortness of breath, sleep disturbances due to breathing, snoring, sputum production and wheezing.    Hematologic/Lymphatic: Negative for bleeding problem. Does not bruise/bleed easily.   Skin: Negative for dry skin, itching and rash.   Musculoskeletal: Negative for arthritis, falls, joint pain, joint swelling and myalgias.   Gastrointestinal: Negative for bloating, abdominal pain, constipation, diarrhea,  "flatus, heartburn, hematemesis, hematochezia, melena, nausea and vomiting.   Genitourinary: Negative for dysuria, frequency, hematuria, nocturia and urgency.   Neurological: Negative for excessive daytime sleepiness, dizziness, headaches, light-headedness and loss of balance.   Psychiatric/Behavioral: Negative for depression. The patient does not have insomnia and is not nervous/anxious.        Objective:     Vitals:    11/13/19 1406   BP: 145/80   BP Location: Left arm   Patient Position: Sitting   Cuff Size: Adult   Pulse: 71   Resp: 18   Temp: 97.7 °F (36.5 °C)   TempSrc: Temporal   SpO2: 96%   Weight: 88.5 kg (195 lb)   Height: 165.1 cm (65\")     Body mass index is 32.45 kg/m².  Physical Exam   Constitutional: He is oriented to person, place, and time. He appears well-developed and well-nourished. He is active and cooperative. No distress.   HENT:   Head: Normocephalic and atraumatic.   Mouth/Throat: Oropharynx is clear and moist.   Eyes: Conjunctivae and EOM are normal. Pupils are equal, round, and reactive to light.   Neck: Normal range of motion. Neck supple. No JVD present. No tracheal deviation present. No thyromegaly present.   Cardiovascular: Normal rate, regular rhythm, normal heart sounds and intact distal pulses.  Occasional extrasystoles are present.   Pulmonary/Chest: Effort normal and breath sounds normal.   Abdominal: Soft. Bowel sounds are normal. He exhibits no distension. There is no tenderness.   Musculoskeletal: Normal range of motion.   Neurological: He is alert and oriented to person, place, and time.   Skin: Skin is warm, dry and intact.   Psychiatric: He has a normal mood and affect. His behavior is normal.   Nursing note and vitals reviewed.      Lab and Diagnostic Review:    Echo:  EF of 60% with mild to moderate hypertrophy.  Grade 2 diastolic dysfunction with pseudonormalization.  Trace to mild TR, mild MR.    PET scan 10/23/2019 showed a small size mild intense city area of inferior " apical ischemia with moderate amount of coronary artery calcification predominantly in the LAD.  Read per Dr. Sullivan is low risk due to the small amount of ischemia detected.   Extended Holter worn for 14 days showed an average heart rate of 72 bpm, PAC burden less than 1%, PVC burden 9.6% 46 runs of SVT with the longest lasting 22 beats with an average heart rate of 145.  4 runs of nonsustained VT.    Assessment and Plan:   1. PVC's (premature ventricular contractions)  Begin toprol 12. 5mg daily  Patient had an intolerance to atenolol years ago but is unable to remember what side effects occurred  - Ambulatory Referral to Cardiology    2. Essential hypertension  Well controlled  HTN Education provided today including signs and symptoms, medication management, daily blood pressure monitoring. Patient encouraged to call the Heart and Valve center with any abnormal readings.   - Ambulatory Referral to Cardiology    3. Dyspnea on exertion    - Ambulatory Referral to Cardiology    4. NSVT (nonsustained ventricular tachycardia) (CMS/HCC)  Normal echo  Small area of ischemia noted on recent PET  - Ambulatory Referral to Cardiology          It has been a pleasure to participate in the care of this patient.  Patient was instructed to call the Heart and Valve Center with any questions, concerns, or worsening symptoms.    *Please note that portions of this note were completed with a voice recognition program. Efforts were made to edit the dictations, but occasionally words are mistranscribed.

## 2019-11-21 ENCOUNTER — TELEPHONE (OUTPATIENT)
Dept: CARDIOLOGY | Facility: HOSPITAL | Age: 79
End: 2019-11-21

## 2019-11-21 NOTE — TELEPHONE ENCOUNTER
Spoke with patient's daughter, Asia today. She reports that her father is tired since starting the toprol but notes that the irregular beats he was experiencing have quieted down. Patient will be establishing with Dr Silverio in the near future.

## 2019-12-05 PROBLEM — N40.0 BPH (BENIGN PROSTATIC HYPERPLASIA): Status: ACTIVE | Noted: 2019-12-05

## 2019-12-05 PROBLEM — I51.89 GRADE II DIASTOLIC DYSFUNCTION: Status: ACTIVE | Noted: 2019-12-05

## 2019-12-05 PROBLEM — R94.39 ABNORMAL STRESS TEST: Status: ACTIVE | Noted: 2019-12-05

## 2019-12-05 NOTE — H&P (VIEW-ONLY)
Subjective   Elver Montenegro is a 79 y.o. male.  Primary Care: Provider, No Known  Referring: JimrossyYenifer WANG, APRN  [id number] [facility]  [id number]  [place] [address]      Chief Complaint   Patient presents with   • PVC's (premature ventricular contractions)   • Hypertension   • Shortness of Breath       Patient Active Problem List    Diagnosis    • Abnormal stress test      1. Cardiac PET  11-23-19  · Myocardial perfusion imaging shows a small size, mild intensity area of inferoapical ischemia.  · REST EF = 59% STRESS EF = 58% Wall motion is normal  · CT portion of the exam shows moderate amount of coronary artery calcification, predominantly in the LAD distribution  · Abnormal myocardial perfusion study with small amount of mild inferoapical ischemia. The study is low risk due to the small amount of ischemia detected   • Dyspnea on exertion    • Palpitations      1. Two week holter monitor 11-21-19:   · PAT  · NSVT, monomorphic  · Single brief Mobitz 2 AV block   • Grade II diastolic dysfunction      1. Echo 10-16-19  · Left ventricular systolic function is normal. Calculated EF = 62% (3D). Estimated EF appears to be in the range of 61 - 65%.  · Ventricular wall thickness is consistent with mild-to-moderate concentric hypertrophy  · Left ventricular diastolic dysfunction is noted (grade II w/high LAP) consistent with pseudonormalization.   • Hypertension    • BPH (benign prostatic hyperplasia)    • Stroke (CMS/HCC)    • Other fatigue    • Senile hyperkeratosis       History of Present Illness   This is an 79-year-old hypertensive male with no prior cardiac history.  He is a poor historian due to hearing loss.  He is accompanied by his daughter who is primary historian.  He presented to the [facility] emergency department October 27, 2019 with a complaint of generalized weakness which have been present for the previous 6 to 8 weeks.  No specific findings were noted that day.  He was treated with fluid  resuscitation for volume depletion.  He later followed up with the heart and valve clinic for further evaluation of generalized weakness.  There is evaluation included a cardiac PET scan showing mild inferoapical ischemia.  He had an echocardiogram showing grade 2 diastolic dysfunction and a 2-week Holter study which showed PAT, monomorphic NSVT and a single, brief Mobitz 2 AV block.  He does not check his blood pressure regularly at home.  His cholesterol status is unknown.  He has no complaint of exertional chest pain, no orthopnea no PND no claudication no lower extremity edema.  He has no awareness of tachyarrhythmias, no dizziness or syncope.  He has no history of atrial fibrillation.  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  He was started on Toprol-XL 25 mg daily and reports near complete resolution of symptoms since starting beta-blockade.  He complains of dyspnea on exertion which also occurs while watching TV or after eating.  He has no history of sleep study.      Past Surgical History:   Procedure Laterality Date   • CHOLECYSTECTOMY     • JOINT REPLACEMENT         The following portions of the patient's history were reviewed and updated as appropriate: allergies, current medications, past family history, past medical history, past social history, past surgical history and problem list.    Allergies   Allergen Reactions   • Clonidine Hcl Shortness Of Breath   • Atenolol Unknown (See Comments)     Patient unclear of reaction   • Clonidine Derivatives    • Irbesartan Swelling   • Lisinopril    • Nifedipine    • Tetanus Toxoid GI Intolerance   • Tetanus Toxoids          Current Outpatient Medications:   •  aspirin 81 MG tablet, Take 81 mg by mouth Daily., Disp: , Rfl:   •  doxazosin (CARDURA) 8 MG tablet, Take 8 mg by mouth Every Night., Disp: , Rfl:   •  metoprolol succinate XL (TOPROL-XL) 25 MG 24 hr tablet, 1/2 tablet po daily, Disp: 30 tablet, Rfl: 0  •  ondansetron  "(ZOFRAN) 4 MG tablet, Take 1 tablet by mouth Every 6 (Six) Hours As Needed for vomiting., Disp: 12 tablet, Rfl: 0  •  triamterene-hydrochlorothiazide (MAXZIDE) 75-50 MG per tablet, Take 1 tablet by mouth Daily., Disp: , Rfl:     Review of Systems   Constitution: Positive for weakness and malaise/fatigue.   HENT: Positive for hearing loss.    Eyes: Negative.    Cardiovascular: Positive for dyspnea on exertion and irregular heartbeat. Negative for chest pain, claudication, near-syncope, orthopnea, palpitations, paroxysmal nocturnal dyspnea and syncope.   Respiratory: Positive for shortness of breath.    Endocrine: Negative.    Hematologic/Lymphatic: Negative.    Skin: Negative.    Musculoskeletal: Negative.    Gastrointestinal: Negative.    Genitourinary: Negative.    Psychiatric/Behavioral: Negative.    Allergic/Immunologic: Negative.    All other systems reviewed and are negative.      Social History     Socioeconomic History   • Marital status:      Spouse name: Not on file   • Number of children: Not on file   • Years of education: Not on file   • Highest education level: Not on file   Tobacco Use   • Smoking status: Never Smoker   • Smokeless tobacco: Never Used   Substance and Sexual Activity   • Alcohol use: No   • Drug use: No   • Sexual activity: Defer   Social History Narrative    caffeine use: rarely       Family History   Problem Relation Age of Onset   • Heart disease Mother    • Cancer Mother    • Aneurysm Father        Objective      /74 (BP Location: Left arm, Patient Position: Sitting)   Pulse 67   Ht 170.2 cm (67\")   Wt 90.3 kg (199 lb)   SpO2 97%   BMI 31.17 kg/m²     Physical Exam   Constitutional: He is oriented to person, place, and time. He appears well-developed and well-nourished.   HENT:   Head: Normocephalic and atraumatic.   Mouth/Throat: Oropharynx is clear and moist.   Eyes: EOM are normal. Pupils are equal, round, and reactive to light. No scleral icterus.   Neck: Neck " supple. No JVD present. No thyromegaly present.   Cardiovascular: Normal rate, regular rhythm and normal heart sounds. Exam reveals no gallop and no friction rub.   No murmur heard.  Pulmonary/Chest: Breath sounds normal. No stridor. He has no wheezes. He has no rales.   Abdominal: Soft. Bowel sounds are normal. He exhibits no distension and no mass. There is no tenderness.   Musculoskeletal: Normal range of motion. He exhibits no edema, tenderness or deformity.   Lymphadenopathy:     He has no cervical adenopathy.   Neurological: He is alert and oriented to person, place, and time. No cranial nerve deficit. Coordination normal.   Skin: Skin is warm and dry. No rash noted. No erythema.   Psychiatric: He has a normal mood and affect.   Vitals reviewed.      Procedures    Lab Review:   Lab Results   Component Value Date    GLUCOSE 134 (H) 10/10/2019    BUN 17 10/10/2019    CREATININE 1.53 (H) 10/10/2019    EGFRIFNONA 44 (L) 10/10/2019    BCR 11.1 10/10/2019    CO2 31.0 (H) 10/10/2019    CALCIUM 9.5 10/10/2019    ALBUMIN 4.40 10/10/2019    AST 17 10/10/2019    ALT 16 10/10/2019       Lab Results   Component Value Date    WBC 10.05 10/10/2019    HGB 15.0 10/10/2019    HCT 46.0 10/10/2019    MCV 87.1 10/10/2019     10/10/2019       No results found for: HGBA1C    Lab Results   Component Value Date    TSH 1.770 05/28/2016     Assessment:   Diagnosis Plan   1. Abnormal stress test  Case Request Cath Lab: Left Heart Cath   2. Dyspnea on exertion possible anginal equivalent Case Request Cath Lab: Left Heart Cath, increase metoprolol succinate to 25 mg daily   3. Grade II diastolic dysfunction   no current heart failure symptoms   4. Essential hypertension   increase Toprol-XL to 25 mg daily   5. Palpitations   no current awareness of palpitations however Holter monitor showing NSVT and Mobitz 2 AV block which are potentially secondary to ischemia.      Plan:  1. Assessment and recommendations as above.  2. Continue  current medications.   3. Follow-up after the procedure.    4. Thank you for allowing us to participate in the care of your patient.     Scribed for Chantelle Silverio MD by Electronically signed by Electronically signed by SUBHASH Segundo, 12/06/19, 11:40 AM.    I, Chantelle Silverio MD, personally performed the services described in this documentation as scribed by the above named individual in my presence, and it is both accurate and complete.  12/6/2019  12:18 PM

## 2019-12-05 NOTE — PROGRESS NOTES
Subjective   Elver Montenegro is a 79 y.o. male.  Primary Care: Provider, No Known  Referring: JimrossyYenifer WANG, APRN  [address] [facility]  [id number]  [place] [address]      Chief Complaint   Patient presents with   • PVC's (premature ventricular contractions)   • Hypertension   • Shortness of Breath       Patient Active Problem List    Diagnosis    • Abnormal stress test      1. Cardiac PET  11-23-19  · Myocardial perfusion imaging shows a small size, mild intensity area of inferoapical ischemia.  · REST EF = 59% STRESS EF = 58% Wall motion is normal  · CT portion of the exam shows moderate amount of coronary artery calcification, predominantly in the LAD distribution  · Abnormal myocardial perfusion study with small amount of mild inferoapical ischemia. The study is low risk due to the small amount of ischemia detected   • Dyspnea on exertion    • Palpitations      1. Two week holter monitor 11-21-19:   · PAT  · NSVT, monomorphic  · Single brief Mobitz 2 AV block   • Grade II diastolic dysfunction      1. Echo 10-16-19  · Left ventricular systolic function is normal. Calculated EF = 62% (3D). Estimated EF appears to be in the range of 61 - 65%.  · Ventricular wall thickness is consistent with mild-to-moderate concentric hypertrophy  · Left ventricular diastolic dysfunction is noted (grade II w/high LAP) consistent with pseudonormalization.   • Hypertension    • BPH (benign prostatic hyperplasia)    • Stroke (CMS/HCC)    • Other fatigue    • Senile hyperkeratosis       History of Present Illness   This is an 79-year-old hypertensive male with no prior cardiac history.  He is a poor historian due to hearing loss.  He is accompanied by his daughter who is primary historian.  He presented to the [facility] emergency department October 27, 2019 with a complaint of generalized weakness which have been present for the previous 6 to 8 weeks.  No specific findings were noted that day.  He was treated with fluid  resuscitation for volume depletion.  He later followed up with the heart and valve clinic for further evaluation of generalized weakness.  There is evaluation included a cardiac PET scan showing mild inferoapical ischemia.  He had an echocardiogram showing grade 2 diastolic dysfunction and a 2-week Holter study which showed PAT, monomorphic NSVT and a single, brief Mobitz 2 AV block.  He does not check his blood pressure regularly at home.  His cholesterol status is unknown.  He has no complaint of exertional chest pain, no orthopnea no PND no claudication no lower extremity edema.  He has no awareness of tachyarrhythmias, no dizziness or syncope.  He has no history of atrial fibrillation.  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  He was started on Toprol-XL 25 mg daily and reports near complete resolution of symptoms since starting beta-blockade.  He complains of dyspnea on exertion which also occurs while watching TV or after eating.  He has no history of sleep study.      Past Surgical History:   Procedure Laterality Date   • CHOLECYSTECTOMY     • JOINT REPLACEMENT         The following portions of the patient's history were reviewed and updated as appropriate: allergies, current medications, past family history, past medical history, past social history, past surgical history and problem list.    Allergies   Allergen Reactions   • Clonidine Hcl Shortness Of Breath   • Atenolol Unknown (See Comments)     Patient unclear of reaction   • Clonidine Derivatives    • Irbesartan Swelling   • Lisinopril    • Nifedipine    • Tetanus Toxoid GI Intolerance   • Tetanus Toxoids          Current Outpatient Medications:   •  aspirin 81 MG tablet, Take 81 mg by mouth Daily., Disp: , Rfl:   •  doxazosin (CARDURA) 8 MG tablet, Take 8 mg by mouth Every Night., Disp: , Rfl:   •  metoprolol succinate XL (TOPROL-XL) 25 MG 24 hr tablet, 1/2 tablet po daily, Disp: 30 tablet, Rfl: 0  •  ondansetron  "(ZOFRAN) 4 MG tablet, Take 1 tablet by mouth Every 6 (Six) Hours As Needed for vomiting., Disp: 12 tablet, Rfl: 0  •  triamterene-hydrochlorothiazide (MAXZIDE) 75-50 MG per tablet, Take 1 tablet by mouth Daily., Disp: , Rfl:     Review of Systems   Constitution: Positive for weakness and malaise/fatigue.   HENT: Positive for hearing loss.    Eyes: Negative.    Cardiovascular: Positive for dyspnea on exertion and irregular heartbeat. Negative for chest pain, claudication, near-syncope, orthopnea, palpitations, paroxysmal nocturnal dyspnea and syncope.   Respiratory: Positive for shortness of breath.    Endocrine: Negative.    Hematologic/Lymphatic: Negative.    Skin: Negative.    Musculoskeletal: Negative.    Gastrointestinal: Negative.    Genitourinary: Negative.    Psychiatric/Behavioral: Negative.    Allergic/Immunologic: Negative.    All other systems reviewed and are negative.      Social History     Socioeconomic History   • Marital status:      Spouse name: Not on file   • Number of children: Not on file   • Years of education: Not on file   • Highest education level: Not on file   Tobacco Use   • Smoking status: Never Smoker   • Smokeless tobacco: Never Used   Substance and Sexual Activity   • Alcohol use: No   • Drug use: No   • Sexual activity: Defer   Social History Narrative    caffeine use: rarely       Family History   Problem Relation Age of Onset   • Heart disease Mother    • Cancer Mother    • Aneurysm Father        Objective      /74 (BP Location: Left arm, Patient Position: Sitting)   Pulse 67   Ht 170.2 cm (67\")   Wt 90.3 kg (199 lb)   SpO2 97%   BMI 31.17 kg/m²     Physical Exam   Constitutional: He is oriented to person, place, and time. He appears well-developed and well-nourished.   HENT:   Head: Normocephalic and atraumatic.   Mouth/Throat: Oropharynx is clear and moist.   Eyes: EOM are normal. Pupils are equal, round, and reactive to light. No scleral icterus.   Neck: Neck " supple. No JVD present. No thyromegaly present.   Cardiovascular: Normal rate, regular rhythm and normal heart sounds. Exam reveals no gallop and no friction rub.   No murmur heard.  Pulmonary/Chest: Breath sounds normal. No stridor. He has no wheezes. He has no rales.   Abdominal: Soft. Bowel sounds are normal. He exhibits no distension and no mass. There is no tenderness.   Musculoskeletal: Normal range of motion. He exhibits no edema, tenderness or deformity.   Lymphadenopathy:     He has no cervical adenopathy.   Neurological: He is alert and oriented to person, place, and time. No cranial nerve deficit. Coordination normal.   Skin: Skin is warm and dry. No rash noted. No erythema.   Psychiatric: He has a normal mood and affect.   Vitals reviewed.        ECG 12 Lead  Date/Time: 12/6/2019 9:39 AM  Performed by: Chantelle Silverio MD  Authorized by: Chantelle Silverio MD   Comparison: compared with previous ECG from 10/10/2019  Similar to previous ECG  Rhythm: sinus rhythm  Rate: normal  BPM: 67  Conduction: conduction normal  QRS axis: normal    Clinical impression: normal ECG            Lab Review:   Lab Results   Component Value Date    GLUCOSE 134 (H) 10/10/2019    BUN 17 10/10/2019    CREATININE 1.53 (H) 10/10/2019    EGFRIFNONA 44 (L) 10/10/2019    BCR 11.1 10/10/2019    CO2 31.0 (H) 10/10/2019    CALCIUM 9.5 10/10/2019    ALBUMIN 4.40 10/10/2019    AST 17 10/10/2019    ALT 16 10/10/2019       Lab Results   Component Value Date    WBC 10.05 10/10/2019    HGB 15.0 10/10/2019    HCT 46.0 10/10/2019    MCV 87.1 10/10/2019     10/10/2019       No results found for: HGBA1C    Lab Results   Component Value Date    TSH 1.770 05/28/2016     Assessment:   Diagnosis Plan   1. Abnormal stress test  Case Request Cath Lab: Left Heart Cath   2. Dyspnea on exertion possible anginal equivalent Case Request Cath Lab: Left Heart Cath, increase metoprolol succinate to 25 mg daily   3. Grade II diastolic dysfunction   no current  heart failure symptoms   4. Essential hypertension   increase Toprol-XL to 25 mg daily   5. Palpitations   no current awareness of palpitations however Holter monitor showing NSVT and Mobitz 2 AV block which are potentially secondary to ischemia.      Plan:  1. Assessment and recommendations as above.  2. Continue current medications.   3. Follow-up after the procedure.    4. Thank you for allowing us to participate in the care of your patient.     Scribed for Chantelle Silverio MD by Electronically signed by Electronically signed by SUBHASH Segundo, 12/06/19, 11:40 AM.    I, Chantelle Silverio MD, personally performed the services described in this documentation as scribed by the above named individual in my presence, and it is both accurate and complete.  12/6/2019  12:18 PM

## 2019-12-06 ENCOUNTER — CONSULT (OUTPATIENT)
Dept: CARDIOLOGY | Facility: CLINIC | Age: 79
End: 2019-12-06

## 2019-12-06 VITALS
SYSTOLIC BLOOD PRESSURE: 138 MMHG | BODY MASS INDEX: 31.23 KG/M2 | WEIGHT: 199 LBS | HEART RATE: 67 BPM | HEIGHT: 67 IN | DIASTOLIC BLOOD PRESSURE: 74 MMHG | OXYGEN SATURATION: 97 %

## 2019-12-06 DIAGNOSIS — R06.09 DYSPNEA ON EXERTION: ICD-10-CM

## 2019-12-06 DIAGNOSIS — R00.2 PALPITATIONS: ICD-10-CM

## 2019-12-06 DIAGNOSIS — I51.89 GRADE II DIASTOLIC DYSFUNCTION: ICD-10-CM

## 2019-12-06 DIAGNOSIS — I10 ESSENTIAL HYPERTENSION: ICD-10-CM

## 2019-12-06 DIAGNOSIS — R94.39 ABNORMAL STRESS TEST: Primary | ICD-10-CM

## 2019-12-06 PROCEDURE — 99204 OFFICE O/P NEW MOD 45 MIN: CPT | Performed by: INTERNAL MEDICINE

## 2019-12-06 PROCEDURE — 93000 ELECTROCARDIOGRAM COMPLETE: CPT | Performed by: INTERNAL MEDICINE

## 2019-12-06 RX ORDER — METOPROLOL SUCCINATE 25 MG/1
25 TABLET, EXTENDED RELEASE ORAL DAILY
Qty: 30 TABLET | Refills: 0 | Status: SHIPPED | OUTPATIENT
Start: 2019-12-06 | End: 2019-12-18 | Stop reason: SINTOL

## 2019-12-09 ENCOUNTER — PREP FOR SURGERY (OUTPATIENT)
Dept: OTHER | Facility: HOSPITAL | Age: 79
End: 2019-12-09

## 2019-12-09 DIAGNOSIS — R94.39 ABNORMAL STRESS TEST: Primary | ICD-10-CM

## 2019-12-09 RX ORDER — ASPIRIN 325 MG
325 TABLET, DELAYED RELEASE (ENTERIC COATED) ORAL DAILY
Status: CANCELLED | OUTPATIENT
Start: 2019-12-10

## 2019-12-09 RX ORDER — SODIUM CHLORIDE 0.9 % (FLUSH) 0.9 %
10 SYRINGE (ML) INJECTION AS NEEDED
Status: CANCELLED | OUTPATIENT
Start: 2019-12-09

## 2019-12-09 RX ORDER — ONDANSETRON 2 MG/ML
4 INJECTION INTRAMUSCULAR; INTRAVENOUS EVERY 8 HOURS PRN
Status: CANCELLED | OUTPATIENT
Start: 2019-12-09

## 2019-12-09 RX ORDER — SODIUM CHLORIDE 0.9 % (FLUSH) 0.9 %
3 SYRINGE (ML) INJECTION EVERY 12 HOURS SCHEDULED
Status: CANCELLED | OUTPATIENT
Start: 2019-12-09

## 2019-12-09 RX ORDER — ASPIRIN 325 MG
325 TABLET ORAL ONCE
Status: CANCELLED | OUTPATIENT
Start: 2019-12-09 | End: 2019-12-09

## 2019-12-09 RX ORDER — NITROGLYCERIN 0.4 MG/1
0.4 TABLET SUBLINGUAL
Status: CANCELLED | OUTPATIENT
Start: 2019-12-09

## 2019-12-11 ENCOUNTER — HOSPITAL ENCOUNTER (OUTPATIENT)
Facility: HOSPITAL | Age: 79
Discharge: HOME | End: 2019-12-11
Attending: INTERNAL MEDICINE | Admitting: INTERNAL MEDICINE

## 2019-12-11 VITALS
BODY MASS INDEX: 30.48 KG/M2 | SYSTOLIC BLOOD PRESSURE: 129 MMHG | TEMPERATURE: 98.1 F | WEIGHT: 194.19 LBS | RESPIRATION RATE: 16 BRPM | OXYGEN SATURATION: 94 % | HEART RATE: 60 BPM | DIASTOLIC BLOOD PRESSURE: 87 MMHG | HEIGHT: 67 IN

## 2019-12-11 DIAGNOSIS — R94.39 ABNORMAL STRESS TEST: ICD-10-CM

## 2019-12-11 DIAGNOSIS — R06.09 DYSPNEA ON EXERTION: ICD-10-CM

## 2019-12-11 PROBLEM — I25.10 CAD (CORONARY ARTERY DISEASE): Status: ACTIVE | Noted: 2019-12-11

## 2019-12-11 LAB
ALBUMIN SERPL-MCNC: 4 G/DL (ref 3.5–5.2)
ALBUMIN/GLOB SERPL: 1.3 G/DL
ALP SERPL-CCNC: 70 U/L (ref 39–117)
ALT SERPL W P-5'-P-CCNC: 14 U/L (ref 1–41)
ANION GAP SERPL CALCULATED.3IONS-SCNC: 11 MMOL/L (ref 5–15)
AST SERPL-CCNC: 15 U/L (ref 1–40)
BILIRUB SERPL-MCNC: 0.9 MG/DL (ref 0.2–1.2)
BUN BLD-MCNC: 20 MG/DL (ref 8–23)
BUN BLDA-MCNC: 20 MG/DL (ref 8–26)
BUN/CREAT SERPL: 12.8 (ref 7–25)
CA-I BLDA-SCNC: 1.25 MMOL/L (ref 1.2–1.32)
CALCIUM SPEC-SCNC: 9.6 MG/DL (ref 8.6–10.5)
CHLORIDE BLDA-SCNC: 102 MMOL/L (ref 98–109)
CHLORIDE SERPL-SCNC: 102 MMOL/L (ref 98–107)
CHOLEST SERPL-MCNC: 146 MG/DL (ref 0–200)
CO2 BLDA-SCNC: 27 MMOL/L (ref 24–29)
CO2 SERPL-SCNC: 25 MMOL/L (ref 22–29)
CREAT BLD-MCNC: 1.56 MG/DL (ref 0.76–1.27)
CREAT BLDA-MCNC: 1.8 MG/DL (ref 0.6–1.3)
DEPRECATED RDW RBC AUTO: 41.1 FL (ref 37–54)
ERYTHROCYTE [DISTWIDTH] IN BLOOD BY AUTOMATED COUNT: 13.1 % (ref 12.3–15.4)
GFR SERPL CREATININE-BSD FRML MDRD: 43 ML/MIN/1.73
GLOBULIN UR ELPH-MCNC: 3.1 GM/DL
GLUCOSE BLD-MCNC: 165 MG/DL (ref 65–99)
GLUCOSE BLDC GLUCOMTR-MCNC: 164 MG/DL (ref 70–130)
HBA1C MFR BLD: 6.6 % (ref 4.8–5.6)
HCT VFR BLD AUTO: 45.6 % (ref 37.5–51)
HCT VFR BLDA CALC: 44 % (ref 38–51)
HDLC SERPL-MCNC: 63 MG/DL (ref 40–60)
HGB BLD-MCNC: 15.3 G/DL (ref 13–17.7)
HGB BLDA-MCNC: 15 G/DL (ref 12–17)
LDLC SERPL CALC-MCNC: 69 MG/DL (ref 0–100)
LDLC/HDLC SERPL: 1.1 {RATIO}
MCH RBC QN AUTO: 29 PG (ref 26.6–33)
MCHC RBC AUTO-ENTMCNC: 33.6 G/DL (ref 31.5–35.7)
MCV RBC AUTO: 86.5 FL (ref 79–97)
PLATELET # BLD AUTO: 215 10*3/MM3 (ref 140–450)
PMV BLD AUTO: 10.6 FL (ref 6–12)
POTASSIUM BLD-SCNC: 3.7 MMOL/L (ref 3.5–5.2)
POTASSIUM BLDA-SCNC: 3.6 MMOL/L (ref 3.5–4.9)
PROT SERPL-MCNC: 7.1 G/DL (ref 6–8.5)
RBC # BLD AUTO: 5.27 10*6/MM3 (ref 4.14–5.8)
SODIUM BLD-SCNC: 138 MMOL/L (ref 136–145)
SODIUM BLDA-SCNC: 139 MMOL/L (ref 138–146)
TRIGL SERPL-MCNC: 69 MG/DL (ref 0–150)
VLDLC SERPL-MCNC: 13.8 MG/DL
WBC NRBC COR # BLD: 8.78 10*3/MM3 (ref 3.4–10.8)

## 2019-12-11 PROCEDURE — 36415 COLL VENOUS BLD VENIPUNCTURE: CPT

## 2019-12-11 PROCEDURE — 80061 LIPID PANEL: CPT | Performed by: INTERNAL MEDICINE

## 2019-12-11 PROCEDURE — 93458 L HRT ARTERY/VENTRICLE ANGIO: CPT | Performed by: INTERNAL MEDICINE

## 2019-12-11 PROCEDURE — 0 IOPAMIDOL PER 1 ML: Performed by: INTERNAL MEDICINE

## 2019-12-11 PROCEDURE — C1769 GUIDE WIRE: HCPCS | Performed by: INTERNAL MEDICINE

## 2019-12-11 PROCEDURE — C1894 INTRO/SHEATH, NON-LASER: HCPCS | Performed by: INTERNAL MEDICINE

## 2019-12-11 PROCEDURE — 85014 HEMATOCRIT: CPT

## 2019-12-11 PROCEDURE — 85027 COMPLETE CBC AUTOMATED: CPT | Performed by: INTERNAL MEDICINE

## 2019-12-11 PROCEDURE — 80053 COMPREHEN METABOLIC PANEL: CPT | Performed by: INTERNAL MEDICINE

## 2019-12-11 PROCEDURE — 83036 HEMOGLOBIN GLYCOSYLATED A1C: CPT | Performed by: INTERNAL MEDICINE

## 2019-12-11 PROCEDURE — 80047 BASIC METABLC PNL IONIZED CA: CPT

## 2019-12-11 RX ORDER — SODIUM CHLORIDE 0.9 % (FLUSH) 0.9 %
3 SYRINGE (ML) INJECTION EVERY 12 HOURS SCHEDULED
Status: DISCONTINUED | OUTPATIENT
Start: 2019-12-11 | End: 2019-12-11 | Stop reason: HOSPADM

## 2019-12-11 RX ORDER — ASPIRIN 325 MG
325 TABLET, DELAYED RELEASE (ENTERIC COATED) ORAL DAILY
Status: DISCONTINUED | OUTPATIENT
Start: 2019-12-12 | End: 2019-12-11 | Stop reason: HOSPADM

## 2019-12-11 RX ORDER — ASPIRIN 325 MG
325 TABLET ORAL ONCE
Status: DISCONTINUED | OUTPATIENT
Start: 2019-12-11 | End: 2019-12-11

## 2019-12-11 RX ORDER — SODIUM CHLORIDE 9 MG/ML
100 INJECTION, SOLUTION INTRAVENOUS CONTINUOUS
Status: ACTIVE | OUTPATIENT
Start: 2019-12-11 | End: 2019-12-11

## 2019-12-11 RX ORDER — NITROGLYCERIN 0.4 MG/1
0.4 TABLET SUBLINGUAL
Status: DISCONTINUED | OUTPATIENT
Start: 2019-12-11 | End: 2019-12-11 | Stop reason: HOSPADM

## 2019-12-11 RX ORDER — ONDANSETRON 2 MG/ML
4 INJECTION INTRAMUSCULAR; INTRAVENOUS EVERY 8 HOURS PRN
Status: DISCONTINUED | OUTPATIENT
Start: 2019-12-11 | End: 2019-12-11 | Stop reason: HOSPADM

## 2019-12-11 RX ORDER — PRAVASTATIN SODIUM 20 MG
20 TABLET ORAL DAILY
Qty: 90 TABLET | Refills: 3 | Status: SHIPPED | OUTPATIENT
Start: 2019-12-11 | End: 2020-09-24

## 2019-12-11 RX ORDER — SODIUM CHLORIDE 9 MG/ML
1-3 INJECTION, SOLUTION INTRAVENOUS CONTINUOUS
Status: DISCONTINUED | OUTPATIENT
Start: 2019-12-11 | End: 2019-12-11 | Stop reason: HOSPADM

## 2019-12-11 RX ORDER — LIDOCAINE HYDROCHLORIDE 10 MG/ML
INJECTION, SOLUTION EPIDURAL; INFILTRATION; INTRACAUDAL; PERINEURAL AS NEEDED
Status: DISCONTINUED | OUTPATIENT
Start: 2019-12-11 | End: 2019-12-11 | Stop reason: HOSPADM

## 2019-12-11 RX ORDER — ISOSORBIDE MONONITRATE 30 MG/1
30 TABLET, EXTENDED RELEASE ORAL DAILY
Qty: 90 TABLET | Refills: 3 | Status: SHIPPED | OUTPATIENT
Start: 2019-12-11 | End: 2020-09-24

## 2019-12-11 RX ORDER — SODIUM CHLORIDE 0.9 % (FLUSH) 0.9 %
10 SYRINGE (ML) INJECTION AS NEEDED
Status: DISCONTINUED | OUTPATIENT
Start: 2019-12-11 | End: 2019-12-11 | Stop reason: HOSPADM

## 2019-12-11 RX ADMIN — SODIUM CHLORIDE 2.99 ML/KG/HR: 9 INJECTION, SOLUTION INTRAVENOUS at 10:45

## 2019-12-11 NOTE — INTERVAL H&P NOTE
H&P reviewed. The patient was examined and there are no changes to the H&P.      Pre-cardiac Catheterization Report  Cardiovascular Laboratory  [facility]    Patient:  Elver Montenegro  :  1940  PCP:  Provider, No Known  PHONE:  [phone]    DATE: 2019      MEDICATIONS:  Prior to Admission medications    Medication Sig Start Date End Date Taking? Authorizing Provider   aspirin 81 MG tablet Take 81 mg by mouth Daily.   Yes Cameron Montgomery MD   doxazosin (CARDURA) 8 MG tablet Take 8 mg by mouth Every Night.   Yes Cameron Montgomery MD   metoprolol succinate XL (TOPROL-XL) 25 MG 24 hr tablet Take 1 tablet by mouth Daily. 19  Yes Manuel Molina PA   triamterene-hydrochlorothiazide (MAXZIDE) 75-50 MG per tablet Take 1 tablet by mouth Daily.   Yes Cameron Montgomery MD   ondansetron (ZOFRAN) 4 MG tablet Take 1 tablet by mouth Every 6 (Six) Hours As Needed for vomiting. 10/25/16   Gianluca Posadas APRN       Past medical & surgical history, social and family history reviewed in the electronic medical record.    Physical Exam:    Vitals:   Vitals:    19 1051   BP: 132/87   Pulse:    Resp:    Temp:    SpO2:           19  1000   Weight: 88.1 kg (194 lb 3 oz)   Body mass index is 30.41 kg/m².    GENERAL: No apparent distress.  No significant changes since last exam.  CHEST: Clear to auscultation bilaterally no stridor no wheeze.  CV: S1, S2, Regular without Murmurs, Rubs or Gallops  EXTREMITIES: No edema.    Barbaeu Test:  Left: Normal  (oxymetric Allens) Right: Not Assessed           Results from last 7 days   Lab Units 19  1014   WBC 10*3/mm3 8.78   HEMOGLOBIN g/dL 15.3   HEMATOCRIT % 45.6   PLATELETS 10*3/mm3 215     No results found for: CHOL, CHLPL  No results found for: TRIG  No results found for: HDL  No results found for: LDL, LDLDIRECT      CrCl cannot be calculated (Patient's most recent lab result is older than the maximum 30 days  allowed.).    IMPRESSION:  Abnormal Stress test    PLAN:  · Procedure to perform: [facility]  · Planned access:  L wrist      Electronically signed by SUBHASH Segundo, 12/11/19, 10:58 AM.

## 2019-12-16 ENCOUNTER — TELEPHONE (OUTPATIENT)
Dept: CARDIOLOGY | Facility: CLINIC | Age: 79
End: 2019-12-16

## 2019-12-16 NOTE — TELEPHONE ENCOUNTER
"Pt daughter calling with concerns about a possible allergic reaction to his metoprolol succ 25 mg daily. He had an allergic reaction (itching) in the past with beta blockers. We increased this on 12/06 but she states the itching started before this day (unsure as to when it exactly started). She describes the itching as \"all over\" without a rash. They've tried topical benadryl cream. She states the pt is feeling \"so much better\" on this medication but isn't sure he can stand the itching long term and wondering if there's anything else we can put him on. Please advise.   "

## 2019-12-18 RX ORDER — BISOPROLOL FUMARATE 5 MG/1
5 TABLET, FILM COATED ORAL DAILY
Qty: 90 TABLET | Refills: 3 | Status: SHIPPED | OUTPATIENT
Start: 2019-12-18 | End: 2020-01-17 | Stop reason: SDDI

## 2019-12-18 NOTE — TELEPHONE ENCOUNTER
Spoke with daughter regarding medication changes. She is agreeable and verbalized understanding. She request we send new medication to [facility] mail order. Medication sent.

## 2020-01-03 ENCOUNTER — TELEPHONE (OUTPATIENT)
Dept: CARDIOLOGY | Facility: CLINIC | Age: 80
End: 2020-01-03

## 2020-01-13 ENCOUNTER — TELEPHONE (OUTPATIENT)
Dept: CARDIOLOGY | Facility: CLINIC | Age: 80
End: 2020-01-13

## 2020-01-13 NOTE — TELEPHONE ENCOUNTER
"Pt daughter, Asia, called and requested her father to be fit on the schedule for Friday, 1/17/20.     States patient has been having intermittent episodes of bradycardia, lowest pulse ox reading was 43. Blood pressure was reading 126/86. Daughter states during this time pt complained of \"not being able to catch his breath.\" When asked if this was with exertion she stated \"no, he is usually fine when moving, it's mostly when he transitions to sitting down and relaxing.\"    Pt's daughter states pt refused to go to the ER. Pt's daughter stated she had increasing concern due to his symptoms, so she called her ex- who is an ER physician and asked for recommendations, of which he recommended pt stop his Bisoprolol. Daughter states pt has not taken this medication since Saturday. Blood pressure has remained in the 120/80's.     Pt has history of PVC's and heart cath which showed diffuse disease of small vessels. Pt has not had an EKG.     He will be coming into the office for an appointment Friday morning 1/17/20. We offered sooner appointment with heart and valve, but family declined. Family states patient is currently stable. Will call back, as necessary.       "

## 2020-01-17 ENCOUNTER — OFFICE VISIT (OUTPATIENT)
Dept: CARDIOLOGY | Facility: CLINIC | Age: 80
End: 2020-01-17

## 2020-01-17 VITALS
BODY MASS INDEX: 31.23 KG/M2 | SYSTOLIC BLOOD PRESSURE: 112 MMHG | HEIGHT: 67 IN | WEIGHT: 199 LBS | OXYGEN SATURATION: 98 % | HEART RATE: 52 BPM | DIASTOLIC BLOOD PRESSURE: 62 MMHG

## 2020-01-17 DIAGNOSIS — I25.10 NON-OCCLUSIVE CORONARY ARTERY DISEASE: Primary | ICD-10-CM

## 2020-01-17 DIAGNOSIS — E78.5 DYSLIPIDEMIA: ICD-10-CM

## 2020-01-17 DIAGNOSIS — I51.89 GRADE II DIASTOLIC DYSFUNCTION: ICD-10-CM

## 2020-01-17 DIAGNOSIS — I10 ESSENTIAL HYPERTENSION: ICD-10-CM

## 2020-01-17 DIAGNOSIS — R00.2 PALPITATIONS: ICD-10-CM

## 2020-01-17 PROCEDURE — 99214 OFFICE O/P EST MOD 30 MIN: CPT | Performed by: INTERNAL MEDICINE

## 2020-01-17 NOTE — PROGRESS NOTES
[facility]    Encounter Date:2020        Patient ID: Elver Montenegro is a 79 y.o. male.  : 1940     PCP: Provider, No Known     Chief Complaint: PVC and Shortness of Breath      PROBLEM LIST:  1. Diffuse coronary artery disease:  a. Cardiac PET, 2019: Rest EF 59%. Stress EF 58%. CT portion of the exam shows moderate amount of coronary artery calcification, predominantly in the LAD distribution Abnormal myocardial perfusion study with small amount of mild inferoapical ischemia. The study is low risk due to the small amount of ischemia detected  b. [facility], 2019, Dr. Silverio: Diffuse, nonfocal disease involving multiple small vessels as well as the small caliber LAD. EF 60%.   2. Palpitations:   a. 2-week Holter, 2019: PAT, NSVT, monomorphic. Single brief Mobitz 2 AVB.   3. Grade II diastolic dysfunction:  a. Echo, 10/16/2019: EF 62%. Mild-to-mod concentric LVH. Left ventricular diastolic dysfunction is noted (grade II w/high LAP) consistent with pseudonormalization.  4. Hypertension.  5. BPH.  6. Senile hyperkeratosis.    History of Present Illness  Patient presents today for hispital follow-up s/p [facility]. Since then, he has been feeling well overall from a cardiovascular standpoint. His daughter repots he was started on a beta blocker, which he stopped taking due to severe fatigue and weakness. He recently purchased a pulse oximeter, and reports his HR and O2Sat have been well-controlled. Patient denies chest pain, palpitations, shortness of breath, edema, dizziness, and syncope.      Allergies   Allergen Reactions   • Clonidine Hcl Shortness Of Breath   • Atenolol Itching and Unknown (See Comments)     Patient unclear of reaction   • Clonidine Derivatives    • Irbesartan Swelling   • Lisinopril Other (See Comments)     UNKNOWN   • Nifedipine    • Tetanus Toxoid GI Intolerance   • Tetanus Toxoids          Current Outpatient Medications:   •  aspirin 81 MG  "tablet, Take 81 mg by mouth Daily., Disp: , Rfl:   •  doxazosin (CARDURA) 8 MG tablet, Take 8 mg by mouth Every Night., Disp: , Rfl:   •  isosorbide mononitrate (IMDUR) 30 MG 24 hr tablet, Take 1 tablet by mouth Daily., Disp: 90 tablet, Rfl: 3  •  pravastatin (PRAVACHOL) 20 MG tablet, Take 1 tablet by mouth Daily., Disp: 90 tablet, Rfl: 3  •  triamterene-hydrochlorothiazide (MAXZIDE) 75-50 MG per tablet, Take 1 tablet by mouth Daily. 0.5 tab qd, Disp: , Rfl:     The following portions of the patient's history were reviewed and updated as appropriate: allergies, current medications, past family history, past medical history, past social history, past surgical history and problem list.    ROS  Review of Systems   Constitution: Negative for chills, fatigue, fever, generalized weakness, weight gain and weight loss.   Cardiovascular: Negative for chest pain, claudication, dyspnea on exertion, leg swelling, orthopnea, palpitations, paroxysmal nocturnal dyspnea and syncope.   Respiratory: Negative for cough, shortness of breath, and wheezing.  HENT: Negative for ear pain, nosebleeds, and tinnitus.  Gastrointestinal: Negative for abdominal pain, constipation, diarrhea, nausea and vomiting.   Genitourinary: No urinary symptoms   Musculoskeletal: Negative for muscle cramps.  Neurological: Negative for dizziness, headaches, loss of balance, numbness, and symptoms of stroke.  Psychiatric: Normal mental status.     All other systems reviewed and are negative.    Objective:     /62 (BP Location: Right arm, Patient Position: Sitting)   Pulse 52   Ht 170.2 cm (67\")   Wt 90.3 kg (199 lb)   SpO2 98%   BMI 31.17 kg/m²        Physical Exam  Constitutional: Patient appears well-developed and well-nourished.   HENT: HEENT exam unremarkable.   Neck: Neck supple. No JVD present. No carotid bruits.   Cardiovascular: Normal rate, regular rhythm and normal heart sounds. No murmur heard.   2+ symmetric pulses.   Pulmonary/Chest: " Breath sounds normal. Does not exhibit tenderness.   Abdominal: Abdomen benign.   Musculoskeletal: Does not exhibit edema.   Neurological: Neurological exam unremarkable.   Vitals reviewed.    Lab Review:   Lab Results   Component Value Date    GLUCOSE 165 (H) 12/11/2019    BUN 20 12/11/2019    CREATININE 1.80 (H) 12/11/2019    EGFRIFNONA 43 (L) 12/11/2019    BCR 12.8 12/11/2019    K 3.7 12/11/2019    CO2 25.0 12/11/2019    CALCIUM 9.6 12/11/2019    ALBUMIN 4.00 12/11/2019    AST 15 12/11/2019    ALT 14 12/11/2019     Lab Results   Component Value Date    CHOL 146 12/11/2019    TRIG 69 12/11/2019    HDL 63 (H) 12/11/2019    LDL 69 12/11/2019      Lab Results   Component Value Date    WBC 8.78 12/11/2019    HGB 15.0 12/11/2019    HCT 44 12/11/2019    MCV 86.5 12/11/2019     12/11/2019     Lab Results   Component Value Date    TSH 1.770 05/28/2016     Lab Results   Component Value Date    HGBA1C 6.60 (H) 12/11/2019        Procedures       Assessment:      Diagnosis Plan   1. Non-occlusive coronary artery disease  Stable and asymptomatic, continue aspirin 81 mg.   2. Palpitations  Stable, continue current medications.   3. Essential hypertension  Well-controlled, continue current medications.   4. Grade II diastolic dysfunction  Off beta blocker therapy due to side effects.   5. Dyslipidemia  Well-controlled, continue pravastatin 20 mg.     Plan:   Stable cardiac status overall.  Continue current medications.  Okay to remain off beta-blockers as he had tremendous side effects related to bradycardia, if he starts experiencing palpitations advised to contact me and we will consider adding diltiazem.  FU in 6 MO, sooner as needed.  Thank you for allowing us to participate in the care of your patient.     Scribed for Chantelle Silverio MD by Kennedi Mcdonald. 1/17/2020  9:27 AM      I, Chantelle Silverio MD, personally performed the services described in this documentation as scribed by the above named individual in my presence,  and it is both accurate and complete.  1/17/2020  9:37 AM        Please note that portions of this note may have been completed with a voice recognition program. Efforts were made to edit the dictations, but occasionally words are mistranscribed.

## 2020-09-24 RX ORDER — BISOPROLOL FUMARATE 5 MG/1
TABLET, FILM COATED ORAL
Qty: 90 TABLET | Refills: 3 | Status: SHIPPED | OUTPATIENT
Start: 2020-09-24 | End: 2020-11-06

## 2020-09-24 RX ORDER — ISOSORBIDE MONONITRATE 30 MG/1
TABLET, EXTENDED RELEASE ORAL
Qty: 90 TABLET | Refills: 3 | Status: SHIPPED | OUTPATIENT
Start: 2020-09-24 | End: 2021-07-15

## 2020-09-24 RX ORDER — PRAVASTATIN SODIUM 20 MG
TABLET ORAL
Qty: 90 TABLET | Refills: 3 | Status: SHIPPED | OUTPATIENT
Start: 2020-09-24 | End: 2021-07-15

## 2020-11-06 ENCOUNTER — OFFICE VISIT (OUTPATIENT)
Dept: CARDIOLOGY | Facility: CLINIC | Age: 80
End: 2020-11-06

## 2020-11-06 VITALS
TEMPERATURE: 97.3 F | SYSTOLIC BLOOD PRESSURE: 118 MMHG | HEART RATE: 78 BPM | OXYGEN SATURATION: 95 % | BODY MASS INDEX: 30.79 KG/M2 | WEIGHT: 196.2 LBS | DIASTOLIC BLOOD PRESSURE: 70 MMHG | HEIGHT: 67 IN

## 2020-11-06 DIAGNOSIS — E78.5 DYSLIPIDEMIA: ICD-10-CM

## 2020-11-06 DIAGNOSIS — I25.10 NON-OCCLUSIVE CORONARY ARTERY DISEASE: Primary | ICD-10-CM

## 2020-11-06 DIAGNOSIS — I51.89 GRADE II DIASTOLIC DYSFUNCTION: ICD-10-CM

## 2020-11-06 DIAGNOSIS — I10 ESSENTIAL HYPERTENSION: ICD-10-CM

## 2020-11-06 PROCEDURE — 99214 OFFICE O/P EST MOD 30 MIN: CPT | Performed by: INTERNAL MEDICINE

## 2020-11-06 RX ORDER — BISOPROLOL FUMARATE 5 MG/1
5 TABLET, FILM COATED ORAL DAILY
Qty: 0.2 TABLET | Refills: 0 | Status: SHIPPED | OUTPATIENT
Start: 2020-11-06 | End: 2020-11-06 | Stop reason: SINTOL

## 2020-11-06 RX ORDER — BISOPROLOL FUMARATE 5 MG/1
5 TABLET, FILM COATED ORAL DAILY
Qty: 1 TABLET | Refills: 0 | Status: CANCELLED | OUTPATIENT
Start: 2020-11-06

## 2020-11-06 NOTE — PROGRESS NOTES
[facility] Cardiology    Encounter Date: 2020    Patient ID: Elver Montenegro is a 80 y.o. male.  : 1940     PCP: Provider, No Known       Chief Complaint: Non-occlusive coronary artery disease and Shortness of Breath      PROBLEM LIST:  1. Diffuse coronary artery disease:  a. Cardiac PET, 2019: Rest EF 59%. Stress EF 58%. CT portion of the exam shows moderate amount of coronary artery calcification, predominantly in the LAD distribution Abnormal myocardial perfusion study with small amount of mild inferoapical ischemia. The study is low risk due to the small amount of ischemia detected  b. [facility], 2019, Dr. Silverio: Diffuse, nonfocal disease involving multiple small vessels as well as the small caliber LAD. EF 60%.   2. Palpitations:   a. 2-week Holter, 2019: PAT, NSVT, monomorphic. Single brief Mobitz 2 AVB.   3. Grade II diastolic dysfunction:  a. Echo, 10/16/2019: EF 62%. Mild-to-mod concentric LVH. Left ventricular diastolic dysfunction is noted (grade II w/high LAP) consistent with pseudonormalization.  4. Hypertension.  5. BPH.  6. Senile hyperkeratosis.    History of Present Illness  Patient presents today for a follow-up with a history of non-occlusive coronary artery disease, palpitations, grade II diastolic dysfunction, and cardiac risk factors. Since last visit, he has been experiencing dyspnea on exertion. He is able to sleep well at night. He sleeps on his side and props his head up with pillows. He is unsure if he is taking the doxazosin for blood pressure or prostate. He is intolerant to beta blockers. Patient otherwise denies chest pain, PND, edema, palpitations, syncope, or presyncope at this time.    Allergies   Allergen Reactions   • Clonidine Hcl Shortness Of Breath   • Atenolol Itching and Unknown (See Comments)     Patient unclear of reaction   • Clonidine Derivatives    • Irbesartan Swelling   • Lisinopril Other (See Comments)     UNKNOWN  "  • Nifedipine    • Tetanus Toxoid GI Intolerance   • Tetanus Toxoids          Current Outpatient Medications:   •  aspirin 81 MG tablet, Take 81 mg by mouth Daily., Disp: , Rfl:   •  diphenhydrAMINE HCl (BENADRYL ALLERGY PO), Take 25 mg by mouth 2 (Two) Times a Day., Disp: , Rfl:   •  doxazosin (CARDURA) 8 MG tablet, Take 8 mg by mouth Every Night., Disp: , Rfl:   •  isosorbide mononitrate (IMDUR) 30 MG 24 hr tablet, TAKE 1 TABLET EVERY DAY, Disp: 90 tablet, Rfl: 3  •  pravastatin (PRAVACHOL) 20 MG tablet, TAKE 1 TABLET EVERY DAY, Disp: 90 tablet, Rfl: 3  •  triamterene-hydrochlorothiazide (MAXZIDE) 75-50 MG per tablet, Take 1 tablet by mouth Daily. 0.5 tab qd, Disp: , Rfl:     The following portions of the patient's history were reviewed and updated as appropriate: allergies, current medications, past family history, past medical history, past social history, past surgical history and problem list.    ROS  Review of Systems   Constitution: Negative for chills, fever, fatigue, generalized weakness.   Cardiovascular: Negative for chest pain, leg swelling, palpitations, orthopnea, and syncope. Positive for dyspnea on exertion.   Respiratory: Negative for cough, shortness of breath, and wheezing.  HENT: Negative for ear pain, nosebleeds, and tinnitus.  Gastrointestinal: Negative for abdominal pain, constipation, diarrhea, nausea and vomiting.   Genitourinary: No urinary symptoms.  Musculoskeletal: Negative for muscle cramps.  Neurological: Negative for dizziness, headaches, loss of balance, numbness, and symptoms of stroke.  Psychiatric: Normal mental status.     All other systems reviewed and are negative.        Objective:     /70 (BP Location: Left arm, Patient Position: Sitting)   Pulse 78   Temp 97.3 °F (36.3 °C)   Ht 170.2 cm (67\")   Wt 89 kg (196 lb 3.2 oz)   SpO2 95%   BMI 30.73 kg/m²      Physical Exam  Constitutional: Patient appears well-developed and well-nourished.   HENT: HEENT exam " unremarkable.   Neck: Neck supple. No JVD present. No carotid bruits.   Cardiovascular: Normal rate, regular rhythm and normal heart sounds. No murmur heard.   2+ symmetric pulses.   Pulmonary/Chest: Breath sounds normal. Does not exhibit tenderness.   Abdominal: Abdomen benign.   Musculoskeletal: Does not exhibit edema.   Neurological: Neurological exam unremarkable.   Vitals reviewed.    Data Review:   Lab Results   Component Value Date    GLUCOSE 165 (H) 12/11/2019    BUN 20 12/11/2019    CREATININE 1.80 (H) 12/11/2019    EGFRIFNONA 43 (L) 12/11/2019    BCR 12.8 12/11/2019    K 3.7 12/11/2019    CO2 25.0 12/11/2019    CALCIUM 9.6 12/11/2019    ALBUMIN 4.00 12/11/2019    AST 15 12/11/2019    ALT 14 12/11/2019     Lab Results   Component Value Date    CHOL 146 12/11/2019    TRIG 69 12/11/2019    HDL 63 (H) 12/11/2019    LDL 69 12/11/2019      Lab Results   Component Value Date    WBC 8.78 12/11/2019    RBC 5.27 12/11/2019    HGB 15.0 12/11/2019    HCT 44 12/11/2019    MCV 86.5 12/11/2019     12/11/2019     Lab Results   Component Value Date    HGBA1C 6.60 (H) 12/11/2019        Procedures       Assessment:      Diagnosis Plan   1. Non-occlusive coronary artery disease  Stable; continue aspirin 81 mg.   2. Grade II diastolic dysfunction  Off of beta blocker therapy due to side effects. Continue current medications.    3. Essential hypertension  Well-controlled; continue current medications.   4. Dyslipidemia  Well-controlled; continue pravastatin 20 mg daily.      Plan:   Stable cardiac status.  His only complaint is exertional dyspnea with moderate to heavy exertion, he is fairly active for his age.  I discussed the option of reducing the dose of Cardura and starting him on beta-blockers to control exertional increase in heart rate however patient and his daughter report that he is severely intolerant and would rather not take any beta-blockers and states that he is overall doing quite well..  Continue  current medications.    FU in 12 MO, sooner as needed.  Thank you for allowing us to participate in the care of your patient.     Scribed for Chantelle Silverio MD by Divine Campa. 11/6/2020  11:44 EST      I, Chantelle Silverio MD, personally performed the services described in this documentation as scribed by the above named individual in my presence, and it is both accurate and complete.  11/6/2020  12:54 EST        Please note that portions of this note may have been completed with a voice recognition program. Efforts were made to edit the dictations, but occasionally words are mistranscribed.

## 2020-11-16 RX ORDER — DOXAZOSIN 8 MG/1
8 TABLET ORAL NIGHTLY
Qty: 90 TABLET | Refills: 3 | Status: SHIPPED | OUTPATIENT
Start: 2020-11-16 | End: 2021-08-19

## 2020-12-08 RX ORDER — TRIAMTERENE AND HYDROCHLOROTHIAZIDE 75; 50 MG/1; MG/1
1 TABLET ORAL DAILY
Qty: 90 TABLET | Refills: 3 | Status: SHIPPED | OUTPATIENT
Start: 2020-12-08 | End: 2020-12-09 | Stop reason: SDUPTHER

## 2020-12-10 RX ORDER — TRIAMTERENE AND HYDROCHLOROTHIAZIDE 75; 50 MG/1; MG/1
1 TABLET ORAL DAILY
Qty: 90 TABLET | Refills: 3 | Status: SHIPPED | OUTPATIENT
Start: 2020-12-10 | End: 2021-11-29

## 2021-07-14 DIAGNOSIS — E78.5 DYSLIPIDEMIA: ICD-10-CM

## 2021-07-14 DIAGNOSIS — I10 ESSENTIAL HYPERTENSION: Primary | ICD-10-CM

## 2021-07-15 RX ORDER — PRAVASTATIN SODIUM 20 MG
TABLET ORAL
Qty: 30 TABLET | Refills: 0 | Status: SHIPPED | OUTPATIENT
Start: 2021-07-15

## 2021-07-15 RX ORDER — ISOSORBIDE MONONITRATE 30 MG/1
TABLET, EXTENDED RELEASE ORAL
Qty: 90 TABLET | Refills: 3 | Status: SHIPPED | OUTPATIENT
Start: 2021-07-15 | End: 2022-06-02

## 2021-08-16 RX ORDER — PRAVASTATIN SODIUM 20 MG
TABLET ORAL
Qty: 30 TABLET | Refills: 0 | OUTPATIENT
Start: 2021-08-16

## 2021-08-16 NOTE — TELEPHONE ENCOUNTER
I gave him a 30-day refill on 7/15/2021 with instructions to have a lipid panel prior to further refills.  His labs remain pending.

## 2021-08-17 NOTE — TELEPHONE ENCOUNTER
Attempted to reach pt via phone to discuss. No answer, no VM set up. Will remail lab orders to pt.

## 2021-08-19 RX ORDER — DOXAZOSIN 8 MG/1
8 TABLET ORAL NIGHTLY
Qty: 90 TABLET | Refills: 3 | Status: SHIPPED | OUTPATIENT
Start: 2021-08-19 | End: 2022-06-14

## 2021-11-11 NOTE — PROGRESS NOTES
[facility] Cardiology    Encounter Date: 2021    Patient ID: Elver Montenegro is a 81 y.o. male.  : 1940     PCP: Provider, No Known       Chief Complaint: Non-occlusive coronary artery disease      PROBLEM LIST:  1. Diffuse coronary artery disease:  a. Cardiac PET, 2019: Rest EF 59%. Stress EF 58%. CT portion of the exam shows moderate amount of coronary artery calcification, predominantly in the LAD distribution Abnormal myocardial perfusion study with small amount of mild inferoapical ischemia. The study is low risk due to the small amount of ischemia detected  b. [facility], 2019, Dr. Silverio: Diffuse, nonfocal disease involving multiple small vessels as well as the small caliber LAD. EF 60%.   2. Palpitations:   a. 2-week Holter, 2019: PAT, NSVT, monomorphic. Single brief Mobitz 2 AVB.   3. Grade II diastolic dysfunction:  a. Echo, 10/16/2019: EF 62%. Mild-to-mod concentric LVH. Left ventricular diastolic dysfunction is noted (grade II w/high LAP) consistent with pseudonormalization.  4. Hypertension.  5. BPH.  6. Senile hyperkeratosis.    History of Present Illness  Patient presents today for an annual follow-up with a history of non-occlusive coronary artery disease, grade II diastolic dysfunction, and cardiac risk factors. Since last visit, the patient has been doing well overall from a cardiovascular standpoint. He experiences shortness of breath with activity but his daughter notes he stays very active for his age and likely over works himself. He measure his oxygen levels routinely which remains at a normal level. Patient denies chest pain, palpitations, edema, dizziness, and syncope.        Allergies   Allergen Reactions   • Clonidine Hcl Shortness Of Breath   • Atenolol Itching and Unknown (See Comments)     Patient unclear of reaction   • Clonidine Derivatives    • Irbesartan Swelling   • Lisinopril Other (See Comments)     UNKNOWN   • Nifedipine    •  "Tetanus Toxoid GI Intolerance   • Tetanus Toxoids          Current Outpatient Medications:   •  aspirin 81 MG tablet, Take 81 mg by mouth Daily., Disp: , Rfl:   •  diphenhydrAMINE HCl (BENADRYL ALLERGY PO), Take 25 mg by mouth 2 (Two) Times a Day., Disp: , Rfl:   •  doxazosin (CARDURA) 8 MG tablet, TAKE 1 TABLET BY MOUTH EVERY NIGHT., Disp: 90 tablet, Rfl: 3  •  isosorbide mononitrate (IMDUR) 30 MG 24 hr tablet, TAKE 1 TABLET EVERY DAY, Disp: 90 tablet, Rfl: 3  •  pravastatin (PRAVACHOL) 20 MG tablet, TAKE 1 TABLET EVERY DAY, Disp: 30 tablet, Rfl: 0  •  triamterene-hydrochlorothiazide (Maxzide) 75-50 MG per tablet, Take 1 tablet by mouth Daily., Disp: 90 tablet, Rfl: 3    The following portions of the patient's history were reviewed and updated as appropriate: allergies, current medications, past family history, past medical history, past social history, past surgical history and problem list.    ROS  Review of Systems   Constitution: Negative for chills, fever, fatigue, generalized weakness.   Cardiovascular: Negative for chest pain, leg swelling, palpitations, orthopnea, and syncope. Positive for dyspnea on exertion  Respiratory: Negative for cough, shortness of breath, and wheezing.  HENT: Negative for ear pain, nosebleeds, and tinnitus.  Gastrointestinal: Negative for abdominal pain, constipation, diarrhea, nausea and vomiting.   Genitourinary: No urinary symptoms.  Musculoskeletal: Negative for muscle cramps.  Neurological: Negative for dizziness, headaches, loss of balance, numbness, and symptoms of stroke.  Psychiatric: Normal mental status.     All other systems reviewed and are negative.        Objective:     /80 (BP Location: Left arm, Patient Position: Sitting)   Pulse 83   Ht 170.2 cm (67\")   Wt 89.4 kg (197 lb)   SpO2 98%   BMI 30.85 kg/m²      Physical Exam  Constitutional: Patient appears well-developed and well-nourished.   HENT: HEENT exam unremarkable.   Neck: Neck supple. No JVD " present. No carotid bruits.   Cardiovascular: Normal rate, regular rhythm and normal heart sounds. No murmur heard.   2+ symmetric pulses.   Pulmonary/Chest: Breath sounds normal. Does not exhibit tenderness.   Abdominal: Abdomen benign.   Musculoskeletal: Does not exhibit edema.   Neurological: Neurological exam unremarkable.   Vitals reviewed.    Data Review:     Lab date: 9/9/2021  • FLP: , TG 61, HDL 51, LDL 69  • CMP: Glu 150, BUN 18, Creat 1.72, eGFR 38, Na 140, K 4.1, Cl 103, CO2 27, Ca 9.8, Alk Phos 67, AST 15, ALT 17  • CBC: WBC 9.4, RBC 5.10, HGB 15.1, HCT 44.4, MCV 87.1, MCH 29.6,   • HbA1c: 6.5     Procedures       Assessment:      Diagnosis Plan   1. Non-occlusive coronary artery disease  Stable with normal LHC 12/2019; continue aspirin 81, Imdur 30 mg, and pravastatin 20 mg   2. Grade II diastolic dysfunction  Stable   3. Essential hypertension  Well controlled; continue Maxzide 75-50 mg and doxazosin 8 mg   4. Dyslipidemia  Well controlled; continue pravastatin 20 mg.      Plan:   Stable cardiac status.   Recommend patient stay as active as he can but take breaks to rest when fatigued and short of breath.  Continue current medications.   FU in 12 MO, sooner as needed.  Thank you for allowing us to participate in the care of your patient.     Scribed for Chantelle Silverio MD by Megan Graves. 11/12/2021 11:21 EST        I, Chantelle Silverio MD, personally performed the services described in this documentation as scribed by the above named individual in my presence, and it is both accurate and complete.  11/15/2021  06:30 EST        Part of this note may be an electronic transcription/translation of spoken language to printed text using the Dragon Dictation System.

## 2021-11-12 ENCOUNTER — OFFICE VISIT (OUTPATIENT)
Dept: CARDIOLOGY | Facility: CLINIC | Age: 81
End: 2021-11-12

## 2021-11-12 VITALS
SYSTOLIC BLOOD PRESSURE: 136 MMHG | HEIGHT: 67 IN | HEART RATE: 83 BPM | BODY MASS INDEX: 30.92 KG/M2 | WEIGHT: 197 LBS | DIASTOLIC BLOOD PRESSURE: 80 MMHG | OXYGEN SATURATION: 98 %

## 2021-11-12 DIAGNOSIS — E78.5 DYSLIPIDEMIA: ICD-10-CM

## 2021-11-12 DIAGNOSIS — I51.89 GRADE II DIASTOLIC DYSFUNCTION: ICD-10-CM

## 2021-11-12 DIAGNOSIS — I10 ESSENTIAL HYPERTENSION: ICD-10-CM

## 2021-11-12 DIAGNOSIS — I25.10 NON-OCCLUSIVE CORONARY ARTERY DISEASE: Primary | ICD-10-CM

## 2021-11-12 PROCEDURE — 99214 OFFICE O/P EST MOD 30 MIN: CPT | Performed by: INTERNAL MEDICINE

## 2021-11-29 RX ORDER — TRIAMTERENE AND HYDROCHLOROTHIAZIDE 75; 50 MG/1; MG/1
TABLET ORAL
Qty: 90 TABLET | Refills: 0 | Status: SHIPPED | OUTPATIENT
Start: 2021-11-29 | End: 2022-02-02

## 2021-12-01 NOTE — TELEPHONE ENCOUNTER
Orders placed and mailed to pt     [de-identified] : 65 year old female referred by Lexy Fox MD for tinnitus. Patient s/p left decompression for trigeminal neuralgia 9/2021- patient does not think procedure helped with symptoms. Patient reports constant pulsating tinnitus bilaterally for about 2 years. history of trigeminal neuralgia. Patient reports tinnitus started after having Covid-19 and being placed on new neurologic medication for trigeminal neuralgia-patient not sure which one caused it. Denies family history of hearing loss. Patient denies otalgia, otorrhea, recent ear infections, hearing loss, dizziness, vertigo, headaches related to hearing.\par  \par RFs: 2x 0.5mg xanax use at night, neuro-modulating med use (carbamazepine, lyrica); h/o craniotomy with MV decompression for trigeminal neuralgia; h/o venous stenting for PT; anxiety; lupus

## 2022-02-02 RX ORDER — TRIAMTERENE AND HYDROCHLOROTHIAZIDE 75; 50 MG/1; MG/1
TABLET ORAL
Qty: 90 TABLET | Refills: 0 | Status: SHIPPED | OUTPATIENT
Start: 2022-02-02 | End: 2022-04-11

## 2022-04-11 RX ORDER — TRIAMTERENE AND HYDROCHLOROTHIAZIDE 75; 50 MG/1; MG/1
TABLET ORAL
Qty: 90 TABLET | Refills: 0 | Status: SHIPPED | OUTPATIENT
Start: 2022-04-11 | End: 2022-06-17

## 2022-06-02 RX ORDER — ISOSORBIDE MONONITRATE 30 MG/1
TABLET, EXTENDED RELEASE ORAL
Qty: 90 TABLET | Refills: 3 | Status: SHIPPED | OUTPATIENT
Start: 2022-06-02

## 2022-06-14 RX ORDER — DOXAZOSIN 8 MG/1
8 TABLET ORAL NIGHTLY
Qty: 90 TABLET | Refills: 0 | Status: SHIPPED | OUTPATIENT
Start: 2022-06-14

## 2022-06-17 RX ORDER — TRIAMTERENE AND HYDROCHLOROTHIAZIDE 75; 50 MG/1; MG/1
TABLET ORAL
Qty: 90 TABLET | Refills: 0 | Status: SHIPPED | OUTPATIENT
Start: 2022-06-17 | End: 2022-11-18 | Stop reason: ALTCHOICE

## 2022-10-20 ENCOUNTER — HOSPITAL ENCOUNTER (EMERGENCY)
Facility: HOSPITAL | Age: 82
Discharge: HOME | End: 2022-10-20
Attending: EMERGENCY MEDICINE | Admitting: EMERGENCY MEDICINE

## 2022-10-20 ENCOUNTER — APPOINTMENT (OUTPATIENT)
Dept: CT IMAGING | Facility: HOSPITAL | Age: 82
End: 2022-10-20

## 2022-10-20 ENCOUNTER — APPOINTMENT (OUTPATIENT)
Dept: GENERAL RADIOLOGY | Facility: HOSPITAL | Age: 82
End: 2022-10-20

## 2022-10-20 VITALS
BODY MASS INDEX: 31.08 KG/M2 | WEIGHT: 198 LBS | TEMPERATURE: 97.8 F | OXYGEN SATURATION: 93 % | RESPIRATION RATE: 26 BRPM | DIASTOLIC BLOOD PRESSURE: 78 MMHG | HEIGHT: 67 IN | HEART RATE: 74 BPM | SYSTOLIC BLOOD PRESSURE: 129 MMHG

## 2022-10-20 DIAGNOSIS — R06.02 SHORTNESS OF BREATH: Primary | ICD-10-CM

## 2022-10-20 DIAGNOSIS — R06.82 TACHYPNEA: ICD-10-CM

## 2022-10-20 DIAGNOSIS — I50.33 ACUTE ON CHRONIC DIASTOLIC CONGESTIVE HEART FAILURE: ICD-10-CM

## 2022-10-20 LAB
ALBUMIN SERPL-MCNC: 3.9 G/DL (ref 3.5–5.2)
ALBUMIN/GLOB SERPL: 1.4 G/DL
ALP SERPL-CCNC: 73 U/L (ref 39–117)
ALT SERPL W P-5'-P-CCNC: 14 U/L (ref 1–41)
ANION GAP SERPL CALCULATED.3IONS-SCNC: 9 MMOL/L (ref 5–15)
AST SERPL-CCNC: 13 U/L (ref 1–40)
BASOPHILS # BLD AUTO: 0.06 10*3/MM3 (ref 0–0.2)
BASOPHILS NFR BLD AUTO: 0.7 % (ref 0–1.5)
BILIRUB SERPL-MCNC: 0.8 MG/DL (ref 0–1.2)
BUN SERPL-MCNC: 12 MG/DL (ref 8–23)
BUN/CREAT SERPL: 7.7 (ref 7–25)
CALCIUM SPEC-SCNC: 9.4 MG/DL (ref 8.6–10.5)
CHLORIDE SERPL-SCNC: 101 MMOL/L (ref 98–107)
CO2 SERPL-SCNC: 29 MMOL/L (ref 22–29)
CREAT BLDA-MCNC: 1.6 MG/DL
CREAT BLDA-MCNC: 1.6 MG/DL (ref 0.6–1.3)
CREAT SERPL-MCNC: 1.55 MG/DL (ref 0.76–1.27)
D-LACTATE SERPL-SCNC: 2 MMOL/L (ref 0.5–2)
DEPRECATED RDW RBC AUTO: 44.3 FL (ref 37–54)
EGFRCR SERPLBLD CKD-EPI 2021: 43.9 ML/MIN/1.73
EOSINOPHIL # BLD AUTO: 0.51 10*3/MM3 (ref 0–0.4)
EOSINOPHIL NFR BLD AUTO: 5.8 % (ref 0.3–6.2)
ERYTHROCYTE [DISTWIDTH] IN BLOOD BY AUTOMATED COUNT: 13.8 % (ref 12.3–15.4)
GLOBULIN UR ELPH-MCNC: 2.8 GM/DL
GLUCOSE SERPL-MCNC: 177 MG/DL (ref 65–99)
HCT VFR BLD AUTO: 44.4 % (ref 37.5–51)
HGB BLD-MCNC: 15.2 G/DL (ref 13–17.7)
IMM GRANULOCYTES # BLD AUTO: 0.04 10*3/MM3 (ref 0–0.05)
IMM GRANULOCYTES NFR BLD AUTO: 0.5 % (ref 0–0.5)
LYMPHOCYTES # BLD AUTO: 1.24 10*3/MM3 (ref 0.7–3.1)
LYMPHOCYTES NFR BLD AUTO: 14.2 % (ref 19.6–45.3)
MCH RBC QN AUTO: 30.4 PG (ref 26.6–33)
MCHC RBC AUTO-ENTMCNC: 34.2 G/DL (ref 31.5–35.7)
MCV RBC AUTO: 88.8 FL (ref 79–97)
MONOCYTES # BLD AUTO: 0.59 10*3/MM3 (ref 0.1–0.9)
MONOCYTES NFR BLD AUTO: 6.7 % (ref 5–12)
NEUTROPHILS NFR BLD AUTO: 6.31 10*3/MM3 (ref 1.7–7)
NEUTROPHILS NFR BLD AUTO: 72.1 % (ref 42.7–76)
NRBC BLD AUTO-RTO: 0 /100 WBC (ref 0–0.2)
NT-PROBNP SERPL-MCNC: 605 PG/ML (ref 0–1800)
PLATELET # BLD AUTO: 209 10*3/MM3 (ref 140–450)
PMV BLD AUTO: 10 FL (ref 6–12)
POTASSIUM SERPL-SCNC: 3.8 MMOL/L (ref 3.5–5.2)
PROT SERPL-MCNC: 6.7 G/DL (ref 6–8.5)
QT INTERVAL: 396 MS
QTC INTERVAL: 445 MS
RBC # BLD AUTO: 5 10*6/MM3 (ref 4.14–5.8)
SODIUM SERPL-SCNC: 139 MMOL/L (ref 136–145)
TROPONIN T SERPL-MCNC: 0.02 NG/ML (ref 0–0.03)
WBC NRBC COR # BLD: 8.75 10*3/MM3 (ref 3.4–10.8)

## 2022-10-20 PROCEDURE — 36415 COLL VENOUS BLD VENIPUNCTURE: CPT

## 2022-10-20 PROCEDURE — 0 IOPAMIDOL PER 1 ML: Performed by: EMERGENCY MEDICINE

## 2022-10-20 PROCEDURE — 84484 ASSAY OF TROPONIN QUANT: CPT | Performed by: EMERGENCY MEDICINE

## 2022-10-20 PROCEDURE — 83605 ASSAY OF LACTIC ACID: CPT | Performed by: EMERGENCY MEDICINE

## 2022-10-20 PROCEDURE — 83880 ASSAY OF NATRIURETIC PEPTIDE: CPT | Performed by: EMERGENCY MEDICINE

## 2022-10-20 PROCEDURE — 71045 X-RAY EXAM CHEST 1 VIEW: CPT

## 2022-10-20 PROCEDURE — 99284 EMERGENCY DEPT VISIT MOD MDM: CPT

## 2022-10-20 PROCEDURE — 85025 COMPLETE CBC W/AUTO DIFF WBC: CPT | Performed by: EMERGENCY MEDICINE

## 2022-10-20 PROCEDURE — 82565 ASSAY OF CREATININE: CPT

## 2022-10-20 PROCEDURE — 93005 ELECTROCARDIOGRAM TRACING: CPT | Performed by: EMERGENCY MEDICINE

## 2022-10-20 PROCEDURE — 80053 COMPREHEN METABOLIC PANEL: CPT | Performed by: EMERGENCY MEDICINE

## 2022-10-20 PROCEDURE — 71275 CT ANGIOGRAPHY CHEST: CPT

## 2022-10-20 RX ORDER — FUROSEMIDE 20 MG/1
20 TABLET ORAL DAILY
Qty: 30 TABLET | Refills: 0 | Status: SHIPPED | OUTPATIENT
Start: 2022-10-20 | End: 2022-11-18 | Stop reason: SDUPTHER

## 2022-10-20 RX ORDER — FUROSEMIDE 20 MG/1
20 TABLET ORAL DAILY
Qty: 30 TABLET | Refills: 0 | Status: SHIPPED | OUTPATIENT
Start: 2022-10-20 | End: 2022-10-20

## 2022-10-20 RX ADMIN — IOPAMIDOL 100 ML: 755 INJECTION, SOLUTION INTRAVENOUS at 14:40

## 2022-10-20 NOTE — DISCHARGE INSTRUCTIONS
I have sent in a prescription for a month of Lasix.  I would like you to take it for 5 days and save the rest to use as directed by your cardiologist or pulmonologist.  Call the pulmonology clinic to arrange follow-up.  Dr. Lynn said that he will see you in his clinic in 2 weeks.  You may stop the Lasix anytime if it seems like it is making you feel worse.  I would also like you to come back if your breathing is worsening or if you have any other concerns.

## 2022-10-20 NOTE — ED PROVIDER NOTES
Subjective   History of Present Illness  Mr. Montenegro presents with shortness of breath.  He tells me he has felt this way for about a month.  He was at his dermatologist office today and was noted to have a respiratory rate of 28 and was sent here for further evaluation.  He denies orthopnea.  He denies fevers or chills.  He had suspected COVID 2 months ago but symptoms resolved before he could be tested.  He had echocardiogram in 2019 showing no systolic dysfunction but did have grade 2 diastolic dysfunction.  Heart cath at that time showed small vessel disease with nothing amenable to catheter-based intervention.  He reports that he has been having episodes of rapid heartbeat recently as well.  He denies pain in his chest.    History provided by:  Patient, relative and medical records  Shortness of Breath  Severity:  Moderate  Onset quality:  Gradual  Timing:  Constant  Progression:  Unchanged  Chronicity:  New  Context comment:  While recovering from recent URI  Relieved by:  Nothing  Worsened by:  Activity  Ineffective treatments:  None tried  Associated symptoms: no abdominal pain, no chest pain, no cough and no fever        Review of Systems   Constitutional: Negative for chills and fever.   HENT: Negative for congestion and rhinorrhea.    Respiratory: Positive for shortness of breath. Negative for cough.    Cardiovascular: Negative for chest pain and leg swelling.   Gastrointestinal: Negative for abdominal pain.   Neurological: Positive for weakness.   Psychiatric/Behavioral: Positive for confusion.   All other systems reviewed and are negative.      Past Medical History:   Diagnosis Date   • Cataract     Left eye   • Hypertension    • Stroke (HCC)        Allergies   Allergen Reactions   • Clonidine Hcl Shortness Of Breath   • Atenolol Itching and Unknown (See Comments)     Patient unclear of reaction   • Clonidine Derivatives    • Irbesartan Swelling   • Lisinopril Other (See Comments)     UNKNOWN   •  Nifedipine    • Tetanus Toxoid GI Intolerance   • Tetanus Toxoids        Past Surgical History:   Procedure Laterality Date   • CARDIAC CATHETERIZATION Left 12/11/2019    Procedure: Left Heart Cath;  Surgeon: Chantelle Silverio MD;  Location: [facility] CATH INVASIVE LOCATION;  Service: Cardiology   • CHOLECYSTECTOMY     • JOINT REPLACEMENT Bilateral     KNEES       Family History   Problem Relation Age of Onset   • Heart disease Mother    • Cancer Mother    • Aneurysm Father        Social History     Socioeconomic History   • Marital status:    Tobacco Use   • Smoking status: Never   • Smokeless tobacco: Never   Substance and Sexual Activity   • Alcohol use: No   • Drug use: No   • Sexual activity: Defer           Objective   Physical Exam  Vitals and nursing note reviewed.   Constitutional:       General: He is not in acute distress.     Appearance: Normal appearance.   HENT:      Head: Normocephalic and atraumatic.      Nose: Nose normal. No congestion or rhinorrhea.      Mouth/Throat:      Mouth: Mucous membranes are moist.      Pharynx: No posterior oropharyngeal erythema.   Eyes:      General: No scleral icterus.     Conjunctiva/sclera: Conjunctivae normal.   Neck:      Comments: No JVD  Cardiovascular:      Rate and Rhythm: Normal rate and regular rhythm.      Heart sounds: No murmur heard.    No friction rub.   Pulmonary:      Effort: Pulmonary effort is normal. Tachypnea present. No respiratory distress.      Breath sounds: Normal breath sounds. No wheezing or rales.   Abdominal:      General: Bowel sounds are normal.      Palpations: Abdomen is soft.      Tenderness: There is no abdominal tenderness. There is no guarding or rebound.   Musculoskeletal:         General: No tenderness.      Cervical back: Normal range of motion and neck supple.      Right lower leg: No tenderness. Edema present.      Left lower leg: No tenderness. Edema present.      Comments: He has mild pitting edema on both ankles   Skin:      General: Skin is warm and dry.      Capillary Refill: Capillary refill takes less than 2 seconds.      Coloration: Skin is not pale.   Neurological:      General: No focal deficit present.      Mental Status: He is alert and oriented to person, place, and time.      Coordination: Coordination normal.   Psychiatric:         Mood and Affect: Mood normal.         Behavior: Behavior normal.         Thought Content: Thought content normal.         Procedures           ED Course  ED Course as of 10/20/22 1532   Thu Oct 20, 2022   1313 I have reviewed his chest x-ray as well as the report.  There is no acute process noted on that.  Given his recent suspected COVID, palpitations, and shortness of breath we will proceed with CT angiogram. [DT]   1456 CTA shows no pulmonary embolism, pneumonia, or other acute problem.  Diastolic dysfunction was noted a couple of years ago on echocardiogram.  Not seeing significant CHF on studies but I think he may feel better with some diuresis. [DT]   1501 Labs do not show acidosis.  Saturations remain good.  Will discuss with Dr. Lynn who is on-call for pulmonology. [DT]   1508 D/W Dr Lynn who reviewed Mr Montenegro's CT. Will see him in the office in 2 weeks. Agrees w treating for Diastolic CHF. [DT]   1513 We will also refer him to the chest pain and CHF clinic as his last echo was 3 years ago and heart cath was 3 years ago as well. [DT]      ED Course User Index  [DT] Malik Paul MD                                           MDM  Number of Diagnoses or Management Options  Acute on chronic diastolic congestive heart failure (HCC): new and requires workup  Shortness of breath: new and requires workup  Tachypnea: new and requires workup     Amount and/or Complexity of Data Reviewed  Clinical lab tests: ordered and reviewed  Tests in the radiology section of CPT®: ordered and reviewed  Decide to obtain previous medical records or to obtain history from someone other than the patient:  yes  Obtain history from someone other than the patient: yes  Review and summarize past medical records: yes  Discuss the patient with other providers: yes  Independent visualization of images, tracings, or specimens: yes    Patient Progress  Patient progress: stable      Final diagnoses:   Shortness of breath   Tachypnea   Acute on chronic diastolic congestive heart failure (HCC)       ED Disposition  ED Disposition     ED Disposition   Discharge    Condition   Stable    Comment   --             Ashleigh Cornejo MD  [address]  [place] [address]  [phone]          [facility] CARDIOLOGY  [address]  [place] [id number] [phone]             Medication List      New Prescriptions    furosemide 20 MG tablet  Commonly known as: LASIX  Take 1 tablet by mouth Daily.           Where to Get Your Medications      These medications were sent to [facility] [id number] - [place] [id number] [facility] AT [address] [phone] [facility] [phone] FX  [address] [facility] [address]    Phone: [phone]   · furosemide 20 MG tablet          Malik Paul MD  10/20/22 1532       Malik Paul MD  10/20/22 1532

## 2022-10-26 ENCOUNTER — OFFICE VISIT (OUTPATIENT)
Dept: CARDIOLOGY | Facility: HOSPITAL | Age: 82
End: 2022-10-26

## 2022-10-26 VITALS
DIASTOLIC BLOOD PRESSURE: 78 MMHG | SYSTOLIC BLOOD PRESSURE: 138 MMHG | RESPIRATION RATE: 22 BRPM | HEIGHT: 67 IN | HEART RATE: 79 BPM | OXYGEN SATURATION: 95 % | TEMPERATURE: 97.1 F | BODY MASS INDEX: 30.7 KG/M2 | WEIGHT: 195.6 LBS

## 2022-10-26 DIAGNOSIS — I51.89 GRADE II DIASTOLIC DYSFUNCTION: ICD-10-CM

## 2022-10-26 DIAGNOSIS — I50.33 ACUTE ON CHRONIC DIASTOLIC CHF (CONGESTIVE HEART FAILURE): Primary | ICD-10-CM

## 2022-10-26 DIAGNOSIS — E78.5 DYSLIPIDEMIA: ICD-10-CM

## 2022-10-26 DIAGNOSIS — I10 ESSENTIAL HYPERTENSION: ICD-10-CM

## 2022-10-26 PROCEDURE — 99214 OFFICE O/P EST MOD 30 MIN: CPT | Performed by: NURSE PRACTITIONER

## 2022-10-26 RX ORDER — POTASSIUM CHLORIDE 750 MG/1
10 CAPSULE, EXTENDED RELEASE ORAL DAILY
Qty: 30 CAPSULE | Refills: 0 | Status: SHIPPED | OUTPATIENT
Start: 2022-10-26 | End: 2022-11-08 | Stop reason: SDUPTHER

## 2022-10-26 NOTE — PROGRESS NOTES
"Chief Complaint  Hospital Follow Up Visit and Diastolic heart Failure    Subjective    History of Present Illness {CC  Problem List  Visit  Diagnosis   Encounters  Notes  Medications  Labs  Result Review Imaging  Media :23}       History of Present Illness   82-year-old male presents the office today at the request of Dr. Paul for ongoing evaluation of his diastolic heart failure.  Patient presented to [facility] ED on 10/20/2022 with complaints of dyspnea.  He was initiated on low-dose Lasix per ED physician.  Patient reports he is still experiencing moderate dyspnea on exertion, notes abdominal fullness and fatigue.  Currently denies chest pressure, palpitations, presyncope or syncope.  Patient has a history of hypertension, PVCs, grade 2 diastolic dysfunction, nonocclusive CAD, dyslipidemia, BPH, history of CVA  Objective     Vital Signs:   Vitals:    10/26/22 0840   BP: 138/78   BP Location: Right arm   Patient Position: Sitting   Cuff Size: Adult   Pulse: 79   Resp: 22   Temp: 97.1 °F (36.2 °C)   TempSrc: Temporal   SpO2: 95%   Weight: 88.7 kg (195 lb 9.6 oz)   Height: 170.2 cm (67\")     Body mass index is 30.64 kg/m².  Physical Exam  Vitals and nursing note reviewed.   Constitutional:       Appearance: Normal appearance.   HENT:      Head: Normocephalic.   Eyes:      Pupils: Pupils are equal, round, and reactive to light.   Cardiovascular:      Rate and Rhythm: Normal rate and regular rhythm.      Pulses: Normal pulses.      Heart sounds: Normal heart sounds. No murmur heard.  Pulmonary:      Effort: Pulmonary effort is normal.      Breath sounds: Rales present.   Abdominal:      General: Bowel sounds are normal. There is distension.      Palpations: Abdomen is soft.   Musculoskeletal:         General: Normal range of motion.      Cervical back: Normal range of motion.      Right lower leg: No edema.      Left lower leg: No edema.   Skin:     General: Skin is warm and dry.      " Capillary Refill: Capillary refill takes less than 2 seconds.   Neurological:      Mental Status: He is alert and oriented to person, place, and time.   Psychiatric:         Mood and Affect: Mood normal.         Thought Content: Thought content normal.              Result Review  Data Reviewed:{ Labs  Result Review  Imaging  Med Tab  Media :23}   CBC & Differential (10/20/2022 13:10)  Comprehensive Metabolic Panel (10/20/2022 13:10)  Troponin (10/20/2022 13:10)  BNP (10/20/2022 13:10)  Lactic Acid, Plasma (10/20/2022 13:10)           Assessment and Plan {CC Problem List  Visit Diagnosis  ROS  Review (Popup)  Health Maintenance  Quality  BestPractice  Medications  SmartSets  SnapShot Encounters  Media :23}   1. Acute on chronic diastolic CHF (congestive heart failure) (HCC)  Increase lasix to 40 mg daily for next 3 days, then resume 20 mg daily on day 4.   Begin potassium chloride 10 meq daily  Heart failure education today including signs and symptoms, the role of the heart failure center, daily weights, low sodium diet (less than 1500 mg per day), and daily physical activity. Reviewed HF Zones with patient and family.  Patient to continue current medications as previously ordered.   - Adult Transthoracic Echo Complete W/ Cont if Necessary Per Protocol; Future  HOLD maxzide while on lasix   2. Grade II diastolic dysfunction  Increase lasix to 40 mg daily for next 3 days     3. Essential hypertension  Well controlled on cardura    4. Dyslipidemia  Stable on pravastatin       Follow Up {Instructions Charge Capture  Follow-up Communications :23}   Return in about 1 week (around 11/2/2022).    Patient was given instructions and counseling regarding his condition or for health maintenance advice. Please see specific information pulled into the AVS if appropriate.  Patient was instructed to call the [facility] with any questions, concerns, or worsening symptoms.

## 2022-10-26 NOTE — PATIENT INSTRUCTIONS
Hold maxzide for the next 3 days  Today take an extra lasix when you get home   Tomorrow and Friday take 2 lasix together in the morning   Begin potassium one tablet daily

## 2022-10-31 ENCOUNTER — APPOINTMENT (OUTPATIENT)
Dept: CARDIOLOGY | Facility: HOSPITAL | Age: 82
End: 2022-10-31

## 2022-11-02 ENCOUNTER — OFFICE VISIT (OUTPATIENT)
Dept: CARDIOLOGY | Facility: HOSPITAL | Age: 82
End: 2022-11-02

## 2022-11-02 VITALS
SYSTOLIC BLOOD PRESSURE: 127 MMHG | TEMPERATURE: 96.3 F | RESPIRATION RATE: 19 BRPM | BODY MASS INDEX: 30.61 KG/M2 | DIASTOLIC BLOOD PRESSURE: 74 MMHG | OXYGEN SATURATION: 98 % | HEART RATE: 76 BPM | HEIGHT: 67 IN | WEIGHT: 195 LBS

## 2022-11-02 DIAGNOSIS — E78.5 DYSLIPIDEMIA: ICD-10-CM

## 2022-11-02 DIAGNOSIS — I10 ESSENTIAL HYPERTENSION: ICD-10-CM

## 2022-11-02 DIAGNOSIS — I50.33 ACUTE ON CHRONIC DIASTOLIC CHF (CONGESTIVE HEART FAILURE): Primary | ICD-10-CM

## 2022-11-02 LAB
ANION GAP SERPL CALCULATED.3IONS-SCNC: 10.3 MMOL/L (ref 5–15)
BUN SERPL-MCNC: 12 MG/DL (ref 8–23)
BUN/CREAT SERPL: 7.9 (ref 7–25)
CALCIUM SPEC-SCNC: 8.9 MG/DL (ref 8.6–10.5)
CHLORIDE SERPL-SCNC: 103 MMOL/L (ref 98–107)
CO2 SERPL-SCNC: 25.7 MMOL/L (ref 22–29)
CREAT SERPL-MCNC: 1.52 MG/DL (ref 0.76–1.27)
EGFRCR SERPLBLD CKD-EPI 2021: 44.9 ML/MIN/1.73
GLUCOSE SERPL-MCNC: 185 MG/DL (ref 65–99)
NT-PROBNP SERPL-MCNC: 1071 PG/ML (ref 0–1800)
POTASSIUM SERPL-SCNC: 3.4 MMOL/L (ref 3.5–5.2)
SODIUM SERPL-SCNC: 139 MMOL/L (ref 136–145)

## 2022-11-02 PROCEDURE — 83880 ASSAY OF NATRIURETIC PEPTIDE: CPT | Performed by: NURSE PRACTITIONER

## 2022-11-02 PROCEDURE — 80048 BASIC METABOLIC PNL TOTAL CA: CPT | Performed by: NURSE PRACTITIONER

## 2022-11-02 PROCEDURE — 99214 OFFICE O/P EST MOD 30 MIN: CPT | Performed by: NURSE PRACTITIONER

## 2022-11-02 RX ORDER — BETAMETHASONE DIPROPIONATE 0.5 MG/G
1 CREAM TOPICAL 2 TIMES DAILY
COMMUNITY

## 2022-11-02 RX ORDER — DESONIDE 0.5 MG/G
1 CREAM TOPICAL 2 TIMES DAILY
COMMUNITY

## 2022-11-02 RX ORDER — FLUOROURACIL 50 MG/G
1 CREAM TOPICAL 2 TIMES DAILY
COMMUNITY
End: 2022-11-04

## 2022-11-04 ENCOUNTER — HOSPITAL ENCOUNTER (EMERGENCY)
Facility: HOSPITAL | Age: 82
Discharge: HOME | End: 2022-11-04
Attending: EMERGENCY MEDICINE | Admitting: EMERGENCY MEDICINE

## 2022-11-04 VITALS
OXYGEN SATURATION: 96 % | HEART RATE: 78 BPM | DIASTOLIC BLOOD PRESSURE: 82 MMHG | HEIGHT: 67 IN | WEIGHT: 198 LBS | TEMPERATURE: 98.2 F | BODY MASS INDEX: 31.08 KG/M2 | SYSTOLIC BLOOD PRESSURE: 130 MMHG | RESPIRATION RATE: 22 BRPM

## 2022-11-04 DIAGNOSIS — N50.89 SCROTAL IRRITATION: Primary | ICD-10-CM

## 2022-11-04 DIAGNOSIS — T50.905A ADVERSE EFFECT OF DRUG, INITIAL ENCOUNTER: ICD-10-CM

## 2022-11-04 PROCEDURE — 99283 EMERGENCY DEPT VISIT LOW MDM: CPT

## 2022-11-04 RX ORDER — HYDROCODONE BITARTRATE AND ACETAMINOPHEN 5; 325 MG/1; MG/1
1-2 TABLET ORAL EVERY 6 HOURS PRN
Qty: 12 TABLET | Refills: 0 | Status: SHIPPED | OUTPATIENT
Start: 2022-11-04 | End: 2022-11-07 | Stop reason: SDUPTHER

## 2022-11-04 RX ORDER — HYDROCODONE BITARTRATE AND ACETAMINOPHEN 7.5; 325 MG/1; MG/1
1 TABLET ORAL ONCE
Status: COMPLETED | OUTPATIENT
Start: 2022-11-04 | End: 2022-11-04

## 2022-11-04 RX ADMIN — HYDROCODONE BITARTRATE AND ACETAMINOPHEN 1 TABLET: 7.5; 325 TABLET ORAL at 14:00

## 2022-11-04 NOTE — ED PROVIDER NOTES
Subjective   History of Present Illness  Pleasant patient who presents to the ER with his daughter for evaluation of scrotal irritation.  He was given several creams [facility] with 1 of those creams being 5-FU to place on his scalp however it appears that the patient put it on his scrotum.  He has subsequently developed irritation and pain.  Denies any fever.  Recent visit to the ER for difficulty breathing and follow-up with cardiology.  Family does report that this has improved.    Rash  Location: Scrotal.  Quality: painful    Pain details:     Quality:  Burning    Severity:  Moderate    Onset quality:  Gradual    Duration: several days.    Timing:  Constant    Progression:  Worsening  Severity:  Severe  Onset quality:  Gradual  Timing:  Constant  Progression:  Worsening  Chronicity:  New  Context: medications    Relieved by:  Nothing  Worsened by:  Contact  Associated symptoms: no abdominal pain, no diarrhea, no fever, no nausea, no shortness of breath, no sore throat, not vomiting and not wheezing        Review of Systems   Constitutional: Negative for chills, diaphoresis and fever.   HENT: Negative for congestion and sore throat.    Respiratory: Negative for cough, choking, chest tightness, shortness of breath and wheezing.    Cardiovascular: Negative for chest pain and leg swelling.   Gastrointestinal: Negative for abdominal distention, abdominal pain, anal bleeding, blood in stool, constipation, diarrhea, nausea and vomiting.   Genitourinary: Negative for difficulty urinating, dysuria, flank pain, frequency, hematuria and urgency.   Skin: Positive for rash.   All other systems reviewed and are negative.      Past Medical History:   Diagnosis Date   • Cataract     Left eye   • Hypertension    • Stroke (HCC)        Allergies   Allergen Reactions   • Clonidine Hcl Shortness Of Breath   • Atenolol Itching and Unknown (See Comments)     Patient unclear of reaction   • Clonidine  Derivatives    • Irbesartan Swelling   • Lisinopril Other (See Comments)     UNKNOWN   • Nifedipine    • Tetanus Toxoid GI Intolerance   • Tetanus Toxoids        Past Surgical History:   Procedure Laterality Date   • CARDIAC CATHETERIZATION Left 12/11/2019    Procedure: Left Heart Cath;  Surgeon: Chantelle Silverio MD;  Location: [facility] CATH INVASIVE LOCATION;  Service: Cardiology   • CHOLECYSTECTOMY     • JOINT REPLACEMENT Bilateral     KNEES       Family History   Problem Relation Age of Onset   • Heart disease Mother    • Cancer Mother    • Aneurysm Father        Social History     Socioeconomic History   • Marital status:    Tobacco Use   • Smoking status: Never   • Smokeless tobacco: Never   Substance and Sexual Activity   • Alcohol use: No   • Drug use: No   • Sexual activity: Defer           Objective   Physical Exam  Constitutional:       Appearance: He is well-developed.   HENT:      Head: Normocephalic and atraumatic.      Right Ear: External ear normal.      Left Ear: External ear normal.      Nose: Nose normal.   Eyes:      Conjunctiva/sclera: Conjunctivae normal.      Pupils: Pupils are equal, round, and reactive to light.   Cardiovascular:      Rate and Rhythm: Normal rate and regular rhythm.      Heart sounds: Normal heart sounds.   Pulmonary:      Effort: Pulmonary effort is normal.      Breath sounds: Normal breath sounds.   Abdominal:      General: Bowel sounds are normal.      Palpations: Abdomen is soft.   Musculoskeletal:         General: Normal range of motion.      Cervical back: Normal range of motion and neck supple.   Skin:     General: Skin is warm and dry.      Comments: Red excoriated scrotum.  Moderate in appearance.  No discharge no swelling.  Does not seem to interfere with the penis or the thighs.  Not consistent with Judith's or gangrene.  There is no streaking.  There is no enlarged lymph nodes.  No areas of necrosis.   Neurological:      Mental Status: He is alert and oriented  to person, place, and time.   Psychiatric:         Behavior: Behavior normal.         Judgment: Judgment normal.         Procedures           ED Course  ED Course as of 11/04/22 1422   Fri Nov 04, 2022   1351 Had a very long sitdown conversation with the patient and his daughter at the bedside.  I did evaluate his scrotum.  I do not see any signs of gangrene or Judith's.  Not consistent with cellulitis.  His skin does look very irritated most likely from the mixup with placing 5-FU on his scrotum.  It also does not look fungal.  At this point I think using Polysporin or Neosporin to prevent sticking along with gentle cleaning with soap and water daily to prevent buildup of that medication would be reasonable.  We will also give him a short course of narcotics as well.  Strict warning signs and symptoms he must [facility] this [facility] for repeat evaluation.  I did discuss with him the possibility of this turning into an infection and gave him the warning signs on that.  As of right now his scrotum does look very irritated and painful but not infectious.  All are thankful and agreeable. [JM]      ED Course User Index  [JM] Gianluca Posadas APRN KASPER reviewed by Malik Paul MD, Damien Mckeon MD, Amalia Max MD       MDM    Final diagnoses:   Scrotal irritation   Adverse effect of drug, initial encounter       ED Disposition  ED Disposition     ED Disposition   Discharge    Condition   Stable    Comment   --             [facility] Emergency Department  [address] [address] [phone]    If symptoms worsen    Please see [facility] this week for further evaluation of your skin reaction.    Schedule an appointment as soon as possible for a visit            Medication List      New Prescriptions    HYDROcodone-acetaminophen 5-325 MG per tablet  Commonly known as: NORCO  Take 1-2 tablets by mouth  Every 6 (Six) Hours As Needed for Severe Pain.        Stop    fluorouracil 5 % cream  Commonly known as: EFUDEX           Where to Get Your Medications      These medications were sent to [facility] [id number] - [address] AT [address] [phone] [facility] [phone] [address] [address]    Phone: [phone]   · HYDROcodone-acetaminophen 5-325 MG per tablet          Gianluca Posadas, LILY  11/04/22 1412       Gianluca Posadas APRN  11/04/22 1422

## 2022-11-06 ENCOUNTER — HOSPITAL ENCOUNTER (OUTPATIENT)
Dept: CARDIOLOGY | Facility: HOSPITAL | Age: 82
Discharge: HOME | End: 2022-11-06
Admitting: NURSE PRACTITIONER

## 2022-11-06 VITALS — HEIGHT: 67 IN | BODY MASS INDEX: 31.08 KG/M2 | WEIGHT: 198 LBS

## 2022-11-06 DIAGNOSIS — I50.33 ACUTE ON CHRONIC DIASTOLIC CHF (CONGESTIVE HEART FAILURE): ICD-10-CM

## 2022-11-06 PROCEDURE — 93306 TTE W/DOPPLER COMPLETE: CPT

## 2022-11-06 PROCEDURE — 93306 TTE W/DOPPLER COMPLETE: CPT | Performed by: INTERNAL MEDICINE

## 2022-11-06 NOTE — PROGRESS NOTES
"Chief Complaint  Follow-up and Congestive Heart Failure    Subjective    History of Present Illness {CC  Problem List  Visit  Diagnosis   Encounters  Notes  Medications  Labs  Result Review Imaging  Media :23}       History of Present Illness   82-year-old male presents today for ongoing evaluation of his acute on chronic diastolic heart failure.  His daughter is with him in the exam room notes the patient recently stopped his Lasix for unclear reasons.  Patient reports he is significantly dyspneic with minimal exertion and notes significant abdominal fullness and pedal edema.  Currently denies chest pain, dizziness, presyncope or syncope.  Blood pressures well controlled 120s to 130s systolic. Patient notes that he has been using multiple prescription  Creams on his face and is complaining of redness on his face.  Objective     Vital Signs:   Vitals:    11/02/22 1132   BP: 127/74   BP Location: Left arm   Patient Position: Sitting   Cuff Size: Adult   Pulse: 76   Resp: 19   Temp: 96.3 °F (35.7 °C)   TempSrc: Temporal   SpO2: 98%   Weight: 88.5 kg (195 lb)   Height: 170.2 cm (67\")     Body mass index is 30.54 kg/m².  Physical Exam  Vitals and nursing note reviewed.   Constitutional:       Appearance: Normal appearance.   HENT:      Head: Normocephalic.   Eyes:      Pupils: Pupils are equal, round, and reactive to light.   Cardiovascular:      Rate and Rhythm: Normal rate and regular rhythm.      Pulses: Normal pulses.      Heart sounds: Normal heart sounds. No murmur heard.  Pulmonary:      Effort: Pulmonary effort is normal.      Breath sounds: Rales present.   Abdominal:      General: Bowel sounds are normal. There is distension.      Palpations: Abdomen is soft.   Musculoskeletal:         General: Normal range of motion.      Cervical back: Normal range of motion.      Right lower leg: Edema (1+ pitting ) present.      Left lower leg: Edema (1+ pitting ) present.   Skin:     General: Skin is warm and " dry.      Capillary Refill: Capillary refill takes less than 2 seconds.   Neurological:      Mental Status: He is alert and oriented to person, place, and time.   Psychiatric:         Mood and Affect: Mood normal.         Thought Content: Thought content normal.              Result Review  Data Reviewed:{ Labs  Result Review  Imaging  Med Tab  Media :23}   Lab Results   Component Value Date    GLUCOSE 185 (H) 11/02/2022    CALCIUM 8.9 11/02/2022     11/02/2022    K 3.4 (L) 11/02/2022    CO2 25.7 11/02/2022     11/02/2022    BUN 12 11/02/2022    CREATININE 1.52 (H) 11/02/2022    EGFRIFNONA 43 (L) 12/11/2019    BCR 7.9 11/02/2022    ANIONGAP 10.3 11/02/2022                Assessment and Plan {CC Problem List  Visit Diagnosis  ROS  Review (Popup)  Health Maintenance  Quality  BestPractice  Medications  SmartSets  SnapShot Encounters  Media :23}   1. Acute on chronic diastolic CHF (congestive heart failure) (HCC)  Patient received 80 mg of Lasix through butterfly in the left AC.  Butterfly was discontinued and area was free of ecchymosis and erythema.  - Basic Metabolic Panel; Future  - proBNP; Future  - Basic Metabolic Panel  - proBNP  Patient to continue Lasix 40 mg daily and potassium 10 mill equivalents daily.  Echo scheduled for Sunday  2. Essential hypertension  Well-controlled on doxazosin, Imdur    3. Dyslipidemia    Stable on pravastatin        Follow Up {Instructions Charge Capture  Follow-up Communications :23}   No follow-ups on file.    Patient was given instructions and counseling regarding his condition or for health maintenance advice. Please see specific information pulled into the AVS if appropriate.  Patient was instructed to call the [facility] with any questions, concerns, or worsening symptoms.

## 2022-11-07 LAB
BH CV ECHO MEAS - AI P1/2T: 495 MSEC
BH CV ECHO MEAS - AO MAX PG: 7.1 MMHG
BH CV ECHO MEAS - AO MEAN PG: 3.5 MMHG
BH CV ECHO MEAS - AO ROOT DIAM: 3.7 CM
BH CV ECHO MEAS - AO V2 MAX: 133 CM/SEC
BH CV ECHO MEAS - AO V2 VTI: 28.3 CM
BH CV ECHO MEAS - AVA(I,D): 2.43 CM2
BH CV ECHO MEAS - EDV(CUBED): 68.9 ML
BH CV ECHO MEAS - EDV(MOD-SP2): 59.2 ML
BH CV ECHO MEAS - EDV(MOD-SP4): 79.1 ML
BH CV ECHO MEAS - EF(MOD-BP): 53.1 %
BH CV ECHO MEAS - EF(MOD-SP2): 52.9 %
BH CV ECHO MEAS - EF(MOD-SP4): 54 %
BH CV ECHO MEAS - ESV(CUBED): 15.6 ML
BH CV ECHO MEAS - ESV(MOD-SP2): 27.9 ML
BH CV ECHO MEAS - ESV(MOD-SP4): 36.4 ML
BH CV ECHO MEAS - FS: 39 %
BH CV ECHO MEAS - IVS/LVPW: 1 CM
BH CV ECHO MEAS - IVSD: 1.3 CM
BH CV ECHO MEAS - LA DIMENSION: 4 CM
BH CV ECHO MEAS - LAT PEAK E' VEL: 7.9 CM/SEC
BH CV ECHO MEAS - LV MASS(C)D: 193.5 GRAMS
BH CV ECHO MEAS - LV MAX PG: 2.5 MMHG
BH CV ECHO MEAS - LV MEAN PG: 1 MMHG
BH CV ECHO MEAS - LV V1 MAX: 79.4 CM/SEC
BH CV ECHO MEAS - LV V1 VTI: 16.6 CM
BH CV ECHO MEAS - LVIDD: 4.1 CM
BH CV ECHO MEAS - LVIDS: 2.5 CM
BH CV ECHO MEAS - LVOT AREA: 4.2 CM2
BH CV ECHO MEAS - LVOT DIAM: 2.3 CM
BH CV ECHO MEAS - LVPWD: 1.3 CM
BH CV ECHO MEAS - MED PEAK E' VEL: 4.2 CM/SEC
BH CV ECHO MEAS - MR MAX PG: 103.6 MMHG
BH CV ECHO MEAS - MR MAX VEL: 509 CM/SEC
BH CV ECHO MEAS - MR MEAN PG: 68 MMHG
BH CV ECHO MEAS - MR MEAN VEL: 388 CM/SEC
BH CV ECHO MEAS - MR VTI: 154 CM
BH CV ECHO MEAS - MV A MAX VEL: 52.9 CM/SEC
BH CV ECHO MEAS - MV DEC SLOPE: 461 CM/SEC2
BH CV ECHO MEAS - MV DEC TIME: 0.18 MSEC
BH CV ECHO MEAS - MV E MAX VEL: 70.6 CM/SEC
BH CV ECHO MEAS - MV E/A: 1.33
BH CV ECHO MEAS - MV P1/2T: 54 MSEC
BH CV ECHO MEAS - MVA(P1/2T): 4.1 CM2
BH CV ECHO MEAS - PA ACC TIME: 0.15 SEC
BH CV ECHO MEAS - PA PR(ACCEL): 11.7 MMHG
BH CV ECHO MEAS - PA V2 MAX: 100.8 CM/SEC
BH CV ECHO MEAS - PI END-D VEL: 103 CM/SEC
BH CV ECHO MEAS - RAP SYSTOLE: 3 MMHG
BH CV ECHO MEAS - RVSP: 29 MMHG
BH CV ECHO MEAS - SV(LVOT): 68.8 ML
BH CV ECHO MEAS - SV(MOD-SP2): 31.3 ML
BH CV ECHO MEAS - SV(MOD-SP4): 42.7 ML
BH CV ECHO MEAS - TAPSE (>1.6): 1.92 CM
BH CV ECHO MEAS - TR MAX PG: 26 MMHG
BH CV ECHO MEAS - TR MAX VEL: 232.3 CM/SEC
BH CV ECHO MEASUREMENTS AVERAGE E/E' RATIO: 11.67
BH CV XLRA - RV BASE: 3.7 CM
BH CV XLRA - RV LENGTH: 7.5 CM
BH CV XLRA - RV MID: 3.2 CM
BH CV XLRA - TDI S': 13.8 CM/SEC
LEFT ATRIUM VOLUME INDEX: 38.6 ML/M2
LV EF 2D ECHO EST: 55 %
MAXIMAL PREDICTED HEART RATE: 136 BPM
STRESS TARGET HR: 116 BPM

## 2022-11-07 RX ORDER — HYDROCODONE BITARTRATE AND ACETAMINOPHEN 5; 325 MG/1; MG/1
1-2 TABLET ORAL EVERY 6 HOURS PRN
Qty: 12 TABLET | Refills: 0 | Status: SHIPPED | OUTPATIENT
Start: 2022-11-07 | End: 2022-11-07 | Stop reason: SDUPTHER

## 2022-11-07 RX ORDER — HYDROCODONE BITARTRATE AND ACETAMINOPHEN 5; 325 MG/1; MG/1
1-2 TABLET ORAL EVERY 6 HOURS PRN
Qty: 12 TABLET | Refills: 0 | Status: SHIPPED | OUTPATIENT
Start: 2022-11-07

## 2022-11-08 ENCOUNTER — TELEPHONE (OUTPATIENT)
Dept: CARDIOLOGY | Facility: HOSPITAL | Age: 82
End: 2022-11-08

## 2022-11-08 RX ORDER — POTASSIUM CHLORIDE 750 MG/1
10 CAPSULE, EXTENDED RELEASE ORAL 2 TIMES DAILY
Qty: 180 CAPSULE | Refills: 3 | Status: SHIPPED | OUTPATIENT
Start: 2022-11-08 | End: 2022-11-18 | Stop reason: SDUPTHER

## 2022-11-08 NOTE — TELEPHONE ENCOUNTER
Reviewed echo and lab work with patient's daughter.  Patient to increase potassium to 20 mEq daily.  Continue Lasix 20 mg daily.  Patient's daughter verbalized understanding.  Patient has upcoming appointment with Dr. Silverio.

## 2022-11-14 NOTE — PROGRESS NOTES
[facility] Cardiology    Encounter Date: 2022    Patient ID: Elver Montenegro is a 82 y.o. male.  : 1940     PCP: Ashleigh Cornejo MD       Chief Complaint: Non-occlusive coronary artery disease      PROBLEM LIST:  1. Diffuse coronary artery disease:  a. Cardiac PET, 2019: Rest EF 59%. Stress EF 58%. CT portion of the exam shows moderate amount of coronary artery calcification, predominantly in the LAD distribution Abnormal myocardial perfusion study with small amount of mild inferoapical ischemia. The study is low risk due to the small amount of ischemia detected  b. [facility], 2019, Dr. Silverio: Diffuse, nonfocal disease involving multiple small vessels as well as the small caliber LAD. EF 60%.   c. Echo, 2022: EF 55%. Left ventricular wall thickness is consistent with mild concentric hypertrophy. Left atrial volume is moderately increased. Mild to moderate MR. Mild TR with normal RVSP.  2. Palpitations:   a. 2w Holter, 2019: PAT, NSVT, monomorphic. Single brief Mobitz 2 AVB.   3. Grade II diastolic dysfunction:  a. Echo, 10/16/2019: EF 62%. Mild-to-mod concentric LVH. Left ventricular diastolic dysfunction is noted (grade II w/high LAP) consistent with pseudonormalization.  4. Hypertension.  5. BPH.  6. Senile hyperkeratosis.    History of Present Illness  Patient presents today for a 1 year follow-up with a history of non-occlusive CAD, grade II diastolic dysfunction, and cardiac risk factors. Patient is hard of hearing and is accompanied by his daughter who reports that since last visit he has had problems with worsening dyspnea.  On 1 occasion he was quite short of breath breathing 27 times per minute and was taken to the ER.  He was diagnosed with diastolic congestive heart failure and received Lasix with clinical improvement.   Subsequently he was scheduled to follow-up with cardiology at [facility] but patient canceled the appointment as  he did not want to see any more doctors.  He has noted improvement with regular use of Lasix with potassium supplementation but has run out of his prescription.  He has also been experiencing generalized itching and rash and was seen by dermatology and was given 3 or 4 creams/lotions for application in different areas of the body and reportedly due to cross-contamination he subsequently developed skin breakdown and burning rash in the genital area which made his condition worse.  He is not taking steroid creams with some improvement.   His lifestyle is sedentary and typical day includes watching TV and seeing visiting friends on occasion. He is able to function independently by bathing and dressing himself. 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 His activity levels have recently declined. Patient denies chest pain, orthopnea, palpitations, edema, dizziness, and syncope.     Allergies   Allergen Reactions   • Clonidine Hcl Shortness Of Breath   • Atenolol Itching and Unknown (See Comments)     Patient unclear of reaction   • Clonidine Derivatives    • Irbesartan Swelling   • Lisinopril Other (See Comments)     UNKNOWN   • Nifedipine    • Tetanus Toxoid GI Intolerance   • Tetanus Toxoids          Current Outpatient Medications:   •  aspirin 81 MG tablet, Take 81 mg by mouth Daily., Disp: , Rfl:   •  diphenhydrAMINE HCl (BENADRYL ALLERGY PO), Take 25 mg by mouth 2 (Two) Times a Day., Disp: , Rfl:   •  doxazosin (CARDURA) 8 MG tablet, TAKE 1 TABLET BY MOUTH EVERY NIGHT., Disp: 90 tablet, Rfl: 0  •  furosemide (LASIX) 20 MG tablet, Take 1 tablet by mouth Daily., Disp: 30 tablet, Rfl: 0  •  HYDROcodone-acetaminophen (NORCO) 5-325 MG per tablet, Take 1-2 tablets by mouth Every 6 (Six) Hours As Needed for Severe Pain., Disp: 12 tablet, Rfl: 0  •  isosorbide mononitrate (IMDUR) 30 MG 24 hr tablet, TAKE 1 TABLET  "EVERY DAY, Disp: 90 tablet, Rfl: 3  •  potassium chloride (MICRO-K) 10 MEQ CR capsule, Take 1 capsule by mouth 2 (Two) Times a Day., Disp: 180 capsule, Rfl: 3  •  pravastatin (PRAVACHOL) 20 MG tablet, TAKE 1 TABLET EVERY DAY, Disp: 30 tablet, Rfl: 0  •  betamethasone dipropionate 0.05 % cream, Apply 1 application topically to the appropriate area as directed 2 (Two) Times a Day., Disp: , Rfl:   •  desonide (DESOWEN) 0.05 % cream, Apply 1 application topically to the appropriate area as directed 2 (Two) Times a Day., Disp: , Rfl:     The following portions of the patient's history were reviewed and updated as appropriate: allergies, current medications, past family history, past medical history, past social history, past surgical history and problem list.    ROS  Review of Systems   14 point ROS negative except for that listed in the HPI.         Objective:     /66 (BP Location: Right arm, Patient Position: Sitting, Cuff Size: Adult)   Pulse 80   Ht 170.2 cm (67\")   Wt 88 kg (194 lb)   SpO2 97%   BMI 30.38 kg/m²      Physical Exam  Constitutional: Patient appears well-developed and well-nourished.   HENT: HEENT exam unremarkable.   Neck: Neck supple. No JVD present. No carotid bruits.   Cardiovascular: Normal rate, regular rhythm and normal heart sounds. No murmur heard.   2+ symmetric pulses.   Pulmonary/Chest: Breath sounds normal. Does not exhibit tenderness.   Abdominal: Abdomen benign.   Musculoskeletal: Does not exhibit edema.   Neurological: Neurological exam unremarkable.   Vitals reviewed.    Data Review:   Lab Results   Component Value Date    GLUCOSE 185 (H) 11/02/2022    BUN 12 11/02/2022    CREATININE 1.52 (H) 11/02/2022    BCR 7.9 11/02/2022     11/02/2022    K 3.4 (L) 11/02/2022    CO2 25.7 11/02/2022    CALCIUM 8.9 11/02/2022    ALBUMIN 3.90 10/20/2022    AST 13 10/20/2022    ALT 14 10/20/2022     Lab date: 09/09/2021  • FLP: , TG 61, HDL 51, LDL 69    Lab Results   Component " Value Date    WBC 8.75 10/20/2022    RBC 5.00 10/20/2022    HGB 15.2 10/20/2022    HCT 44.4 10/20/2022    MCV 88.8 10/20/2022     10/20/2022        Procedures             Assessment:      Diagnosis Plan   1. Non-occlusive coronary artery disease  Stable without angina on current activity. Continue on aspirin 81 mg for antiplatelet therapy and Imdur 30 mg daily for chest pain.    2. Grade II diastolic dysfunction/diastolic CHF Stable and appears euvolemic clinically.  Continue current dose of Lasix and potassium, further add Jardiance 10 mg daily.  Check BMP for monitoring of renal function and electrolytes    3. Essential hypertension  Well controlled.  Continue current dose of doxazosin which he also uses for prostate, continue current dose of Lasix and Imdur   4. Dyslipidemia  monitored by PCP, continue current dose of pravastatin     Plan:   Stable cardiac status overall with recent onset of diastolic CHF like symptoms.   Initiate Jardiance 10 mg daily for diastolic CHF, take Lasix and potassium as needed for volume overload  BMP to check electrolytes and renal function  Continue all other current medications.   Continue current medications.   FU in 3 MO, sooner as needed.  Thank you for allowing us to participate in the care of your patient.     Scribed for Chantelle Silverio MD by Italia Romero. 11/18/2022 10:06 EST    I, Chantelle Silverio MD, personally performed the services described in this documentation as scribed by the above named individual in my presence, and it is both accurate and complete.  11/21/2022  06:19 EST      Please note that portions of this note may have been completed with a voice recognition program. Efforts were made to edit the dictations, but occasionally words are mistranscribed.

## 2022-11-18 ENCOUNTER — LAB (OUTPATIENT)
Dept: LAB | Facility: HOSPITAL | Age: 82
End: 2022-11-18

## 2022-11-18 ENCOUNTER — OFFICE VISIT (OUTPATIENT)
Dept: CARDIOLOGY | Facility: CLINIC | Age: 82
End: 2022-11-18

## 2022-11-18 VITALS
HEART RATE: 80 BPM | BODY MASS INDEX: 30.45 KG/M2 | WEIGHT: 194 LBS | HEIGHT: 67 IN | DIASTOLIC BLOOD PRESSURE: 66 MMHG | OXYGEN SATURATION: 97 % | SYSTOLIC BLOOD PRESSURE: 126 MMHG

## 2022-11-18 DIAGNOSIS — I51.89 GRADE II DIASTOLIC DYSFUNCTION: ICD-10-CM

## 2022-11-18 DIAGNOSIS — I10 ESSENTIAL HYPERTENSION: ICD-10-CM

## 2022-11-18 DIAGNOSIS — E78.5 DYSLIPIDEMIA: ICD-10-CM

## 2022-11-18 DIAGNOSIS — I25.10 NON-OCCLUSIVE CORONARY ARTERY DISEASE: ICD-10-CM

## 2022-11-18 DIAGNOSIS — I25.10 NON-OCCLUSIVE CORONARY ARTERY DISEASE: Primary | ICD-10-CM

## 2022-11-18 LAB
BUN SERPL-MCNC: 12 MG/DL (ref 8–23)
BUN/CREAT SERPL: 7.3 (ref 7–25)
CALCIUM SERPL-MCNC: 9.7 MG/DL (ref 8.6–10.5)
CHLORIDE SERPL-SCNC: 102 MMOL/L (ref 98–107)
CO2 SERPL-SCNC: 28.3 MMOL/L (ref 22–29)
CREAT SERPL-MCNC: 1.64 MG/DL (ref 0.76–1.27)
EGFRCR SERPLBLD CKD-EPI 2021: 41 ML/MIN/1.73
GLUCOSE SERPL-MCNC: 160 MG/DL (ref 65–99)
POTASSIUM SERPL-SCNC: 4.3 MMOL/L (ref 3.5–5.2)
SODIUM SERPL-SCNC: 139 MMOL/L (ref 136–145)

## 2022-11-18 PROCEDURE — 99214 OFFICE O/P EST MOD 30 MIN: CPT | Performed by: INTERNAL MEDICINE

## 2022-11-18 RX ORDER — FUROSEMIDE 20 MG/1
20 TABLET ORAL AS NEEDED
Qty: 90 TABLET | Refills: 0 | Status: SHIPPED | OUTPATIENT
Start: 2022-11-18 | End: 2023-02-21

## 2022-11-18 RX ORDER — POTASSIUM CHLORIDE 750 MG/1
10 CAPSULE, EXTENDED RELEASE ORAL DAILY PRN
Qty: 90 CAPSULE | Refills: 0 | Status: SHIPPED | OUTPATIENT
Start: 2022-11-18 | End: 2023-04-05

## 2022-11-23 RX ORDER — TRIAMTERENE AND HYDROCHLOROTHIAZIDE 75; 50 MG/1; MG/1
TABLET ORAL
Qty: 90 TABLET | Refills: 0 | OUTPATIENT
Start: 2022-11-23

## 2022-12-21 RX ORDER — TRIAMTERENE AND HYDROCHLOROTHIAZIDE 75; 50 MG/1; MG/1
TABLET ORAL
Qty: 90 TABLET | Refills: 0 | OUTPATIENT
Start: 2022-12-21

## 2023-02-21 RX ORDER — TRIAMTERENE AND HYDROCHLOROTHIAZIDE 75; 50 MG/1; MG/1
TABLET ORAL
Qty: 90 TABLET | Refills: 0 | OUTPATIENT
Start: 2023-02-21

## 2023-02-21 RX ORDER — FUROSEMIDE 20 MG/1
20 TABLET ORAL AS NEEDED
Qty: 90 TABLET | Refills: 0 | Status: SHIPPED | OUTPATIENT
Start: 2023-02-21 | End: 2023-02-23 | Stop reason: SDUPTHER

## 2023-02-23 RX ORDER — FUROSEMIDE 20 MG/1
20 TABLET ORAL DAILY
Qty: 90 TABLET | Refills: 0 | Status: SHIPPED | OUTPATIENT
Start: 2023-02-23

## 2023-04-05 RX ORDER — POTASSIUM CHLORIDE 750 MG/1
CAPSULE, EXTENDED RELEASE ORAL
Qty: 90 CAPSULE | Refills: 0 | Status: SHIPPED | OUTPATIENT
Start: 2023-04-05

## 2023-04-24 NOTE — PROGRESS NOTES
[facility]    Encounter Date: 2023    Patient ID: Elver Montenegro is a 83 y.o. male.  : 1940     PCP: Ashleigh Cornejo MD       Chief Complaint: Non-occlusive coronary artery disease      PROBLEM LIST:  1. Diffuse coronary artery disease:  a. Cardiac PET, 2019: Rest EF 59%. Stress EF 58%. CT portion of the exam shows moderate amount of coronary artery calcification, predominantly in the LAD distribution Abnormal myocardial perfusion study with small amount of mild inferoapical ischemia. The study is low risk due to the small amount of ischemia detected  b. [facility], 2019, Dr. Silverio: Diffuse, nonfocal disease involving multiple small vessels as well as the small caliber LAD. EF 60%.   c. Echo, 2022: EF 55%. Left ventricular wall thickness is consistent with mild concentric hypertrophy. Left atrial volume is moderately increased. Mild to moderate MR. Mild TR with normal RVSP.  2. Palpitations:   a. 2w Holter, 2019: PAT, NSVT, monomorphic. Single brief Mobitz 2 AVB.   3. Grade II diastolic dysfunction:  a. Echo, 10/16/2019: EF 62%. Mild-to-mod concentric LVH. Left ventricular diastolic dysfunction is noted (grade II w/high LAP) consistent with pseudonormalization.  4. Hypertension.  5. BPH.  6. Senile hyperkeratosis.    History of Present Illness  Patient presents today for a follow-up with a history of CAD, and cardiac risk factors. Since last visit, patient has continued to be short of breath but this has become his baseline.  His shortness of breath has not worsened.  He and his daughter do not feel like his shortness of air have improved with Jardiance.  Daughter is not his daughter is a nurse and states that she patient has not been volume overloaded.  She is also not 100% certain that he has been taking his medications as he is supposed to.  He is short of breath at rest and has audible wheezing.  [facility] and  "states that his BMP from 3/8/2023 does show a creatinine of 1.86 and a potassium of 3.6.  She believes he has been taking Lasix without potassium.  Patient does agree to this.    Allergies   Allergen Reactions   • Clonidine Hcl Shortness Of Breath   • Atenolol Itching and Unknown (See Comments)     Patient unclear of reaction   • Clonidine Derivatives    • Irbesartan Swelling   • Lisinopril Other (See Comments)     UNKNOWN   • Nifedipine    • Tetanus Toxoid GI Intolerance   • Tetanus Toxoids          Current Outpatient Medications:   •  aspirin 81 MG tablet, Take 1 tablet by mouth Daily., Disp: , Rfl:   •  diphenhydrAMINE HCl (BENADRYL ALLERGY PO), Take 25 mg by mouth 2 (Two) Times a Day., Disp: , Rfl:   •  empagliflozin (Jardiance) 10 MG tablet tablet, Take 1 tablet by mouth Daily., Disp: 90 tablet, Rfl: 3  •  furosemide (LASIX) 20 MG tablet, Take 1 tablet by mouth Daily. TAKE ONE TABLET DAILY AS NEEDED FOR FLUID RETENTION, Disp: 90 tablet, Rfl: 0  •  isosorbide mononitrate (IMDUR) 30 MG 24 hr tablet, TAKE 1 TABLET EVERY DAY, Disp: 90 tablet, Rfl: 3  •  potassium chloride (MICRO-K) 10 MEQ CR capsule, TAKE 1 CAPSULE EVERY DAY (WITH LASIX) AS NEEDED (Patient not taking: Reported on 4/28/2023), Disp: 90 capsule, Rfl: 0  •  pravastatin (PRAVACHOL) 20 MG tablet, TAKE 1 TABLET EVERY DAY, Disp: 30 tablet, Rfl: 0  •  doxazosin (CARDURA) 8 MG tablet, TAKE 1 TABLET BY MOUTH EVERY NIGHT. (Patient not taking: Reported on 4/28/2023), Disp: 90 tablet, Rfl: 0    The following portions of the patient's history were reviewed and updated as appropriate: allergies, current medications, past family history, past medical history, past social history, past surgical history and problem list.    ROS  Review of Systems   14 point ROS negative except for that listed in the HPI.         Objective:     /64 (BP Location: Left arm, Patient Position: Sitting)   Pulse 82   Ht 167.6 cm (66\")   Wt 87.5 kg (193 lb)   SpO2 96%   BMI 31.15 " kg/m²      Physical Exam  Constitutional: Patient appears well-developed and well-nourished.   HENT: HEENT exam unremarkable.   Neck: Neck supple. No JVD present. No carotid bruits.   Cardiovascular: Normal rate, regular rhythm and normal heart sounds. No murmur heard.   2+ symmetric pulses.   Pulmonary/Chest: Breath sounds normal. Does not exhibit tenderness.   Abdominal: Abdomen benign.   Musculoskeletal: Does not exhibit edema.   Neurological: Neurological exam unremarkable.   Vitals reviewed.    Data Review:   Lab Results   Component Value Date    GLUCOSE 160 (H) 11/18/2022    BUN 12 11/18/2022    CREATININE 1.64 (H) 11/18/2022    EGFR 44.9 (L) 11/02/2022    BCR 7.3 11/18/2022     11/18/2022    K 4.3 11/18/2022    CO2 28.3 11/18/2022    CALCIUM 9.7 11/18/2022    ALBUMIN 3.90 10/20/2022    AST 13 10/20/2022    ALT 14 10/20/2022     Lab date: 09/09/2021  • FLP: , TG 61, HDL 51, LDL 69      Lab Results   Component Value Date    WBC 8.75 10/20/2022    RBC 5.00 10/20/2022    HGB 15.2 10/20/2022    HCT 44.4 10/20/2022    MCV 88.8 10/20/2022     10/20/2022     [facility]: Creatinine 1.86, potassium 3.6    Procedures           Assessment:      Diagnosis   1. Coronary artery disease involving native coronary artery of native heart without angina pectoris    2. Grade II diastolic dysfunction    3. Essential hypertension    4. Dyslipidemia      Plan:   Symptoms appear more consistent with pulmonary disease and cardiac etiology.    Discontinue Lasix and potassium due to increased and creatinine and no evidence of volume overload.  Will refer to pulmonary medicine for further evaluation of shortness of air.  Discussed importance of compliance of medications with patient and he verbalizes understanding.  Continue current medications.   FU in 3 MO, sooner as needed.  Thank you for allowing us to participate in the care of your patient.         Dolores Patrick PA-C    Please note that portions of this  note may have been completed with a voice recognition program. Efforts were made to edit the dictations, but occasionally words are mistranscribed.

## 2023-04-28 ENCOUNTER — OFFICE VISIT (OUTPATIENT)
Dept: CARDIOLOGY | Facility: CLINIC | Age: 83
End: 2023-04-28
Payer: MEDICARE

## 2023-04-28 VITALS
HEIGHT: 66 IN | HEART RATE: 82 BPM | BODY MASS INDEX: 31.02 KG/M2 | DIASTOLIC BLOOD PRESSURE: 64 MMHG | OXYGEN SATURATION: 96 % | SYSTOLIC BLOOD PRESSURE: 112 MMHG | WEIGHT: 193 LBS

## 2023-04-28 DIAGNOSIS — R06.02 SHORTNESS OF BREATH: ICD-10-CM

## 2023-04-28 DIAGNOSIS — I25.10 CORONARY ARTERY DISEASE INVOLVING NATIVE CORONARY ARTERY OF NATIVE HEART WITHOUT ANGINA PECTORIS: Primary | ICD-10-CM

## 2023-04-28 DIAGNOSIS — I51.89 GRADE II DIASTOLIC DYSFUNCTION: ICD-10-CM

## 2023-04-28 DIAGNOSIS — E78.5 DYSLIPIDEMIA: ICD-10-CM

## 2023-04-28 DIAGNOSIS — I10 ESSENTIAL HYPERTENSION: ICD-10-CM

## 2023-05-24 RX ORDER — FUROSEMIDE 20 MG/1
TABLET ORAL
Qty: 90 TABLET | Refills: 0 | OUTPATIENT
Start: 2023-05-24

## 2023-06-01 ENCOUNTER — HOSPITAL ENCOUNTER (EMERGENCY)
Facility: HOSPITAL | Age: 83
Discharge: HOME | End: 2023-06-01
Attending: EMERGENCY MEDICINE
Payer: MEDICARE

## 2023-06-01 ENCOUNTER — APPOINTMENT (OUTPATIENT)
Dept: GENERAL RADIOLOGY | Facility: HOSPITAL | Age: 83
End: 2023-06-01
Payer: MEDICARE

## 2023-06-01 VITALS
SYSTOLIC BLOOD PRESSURE: 123 MMHG | HEART RATE: 61 BPM | RESPIRATION RATE: 24 BRPM | HEIGHT: 67 IN | TEMPERATURE: 97.4 F | BODY MASS INDEX: 30.13 KG/M2 | OXYGEN SATURATION: 95 % | DIASTOLIC BLOOD PRESSURE: 78 MMHG | WEIGHT: 192 LBS

## 2023-06-01 DIAGNOSIS — K92.2 LOWER GI BLEED: Primary | ICD-10-CM

## 2023-06-01 DIAGNOSIS — R73.09 ELEVATED GLUCOSE: ICD-10-CM

## 2023-06-01 DIAGNOSIS — K64.9 HEMORRHOIDS, UNSPECIFIED HEMORRHOID TYPE: ICD-10-CM

## 2023-06-01 DIAGNOSIS — N18.30 CHRONIC RENAL INSUFFICIENCY, STAGE 3 (MODERATE): ICD-10-CM

## 2023-06-01 DIAGNOSIS — J84.10 PULMONARY FIBROSIS: ICD-10-CM

## 2023-06-01 LAB
ALBUMIN SERPL-MCNC: 3.7 G/DL (ref 3.5–5.2)
ALBUMIN/GLOB SERPL: 1.5 G/DL
ALP SERPL-CCNC: 85 U/L (ref 39–117)
ALT SERPL W P-5'-P-CCNC: 10 U/L (ref 1–41)
ANION GAP SERPL CALCULATED.3IONS-SCNC: 13 MMOL/L (ref 5–15)
AST SERPL-CCNC: 12 U/L (ref 1–40)
BASOPHILS # BLD AUTO: 0.07 10*3/MM3 (ref 0–0.2)
BASOPHILS NFR BLD AUTO: 0.9 % (ref 0–1.5)
BILIRUB SERPL-MCNC: 0.6 MG/DL (ref 0–1.2)
BUN SERPL-MCNC: 9 MG/DL (ref 8–23)
BUN/CREAT SERPL: 5.8 (ref 7–25)
CALCIUM SPEC-SCNC: 9.1 MG/DL (ref 8.6–10.5)
CHLORIDE SERPL-SCNC: 107 MMOL/L (ref 98–107)
CO2 SERPL-SCNC: 22 MMOL/L (ref 22–29)
CREAT SERPL-MCNC: 1.55 MG/DL (ref 0.76–1.27)
DEPRECATED RDW RBC AUTO: 42.2 FL (ref 37–54)
EGFRCR SERPLBLD CKD-EPI 2021: 43.6 ML/MIN/1.73
EOSINOPHIL # BLD AUTO: 0.46 10*3/MM3 (ref 0–0.4)
EOSINOPHIL NFR BLD AUTO: 5.7 % (ref 0.3–6.2)
ERYTHROCYTE [DISTWIDTH] IN BLOOD BY AUTOMATED COUNT: 12.9 % (ref 12.3–15.4)
GLOBULIN UR ELPH-MCNC: 2.4 GM/DL
GLUCOSE SERPL-MCNC: 197 MG/DL (ref 65–99)
HCT VFR BLD AUTO: 45.7 % (ref 37.5–51)
HGB BLD-MCNC: 15.1 G/DL (ref 13–17.7)
HOLD SPECIMEN: NORMAL
IMM GRANULOCYTES # BLD AUTO: 0.02 10*3/MM3 (ref 0–0.05)
IMM GRANULOCYTES NFR BLD AUTO: 0.2 % (ref 0–0.5)
LYMPHOCYTES # BLD AUTO: 0.94 10*3/MM3 (ref 0.7–3.1)
LYMPHOCYTES NFR BLD AUTO: 11.7 % (ref 19.6–45.3)
MCH RBC QN AUTO: 29.3 PG (ref 26.6–33)
MCHC RBC AUTO-ENTMCNC: 33 G/DL (ref 31.5–35.7)
MCV RBC AUTO: 88.6 FL (ref 79–97)
MONOCYTES # BLD AUTO: 0.5 10*3/MM3 (ref 0.1–0.9)
MONOCYTES NFR BLD AUTO: 6.2 % (ref 5–12)
NEUTROPHILS NFR BLD AUTO: 6.04 10*3/MM3 (ref 1.7–7)
NEUTROPHILS NFR BLD AUTO: 75.3 % (ref 42.7–76)
NRBC BLD AUTO-RTO: 0 /100 WBC (ref 0–0.2)
NT-PROBNP SERPL-MCNC: 1057 PG/ML (ref 0–1800)
PLATELET # BLD AUTO: 195 10*3/MM3 (ref 140–450)
PMV BLD AUTO: 10.2 FL (ref 6–12)
POTASSIUM SERPL-SCNC: 3.7 MMOL/L (ref 3.5–5.2)
PROT SERPL-MCNC: 6.1 G/DL (ref 6–8.5)
QT INTERVAL: 410 MS
QTC INTERVAL: 461 MS
RBC # BLD AUTO: 5.16 10*6/MM3 (ref 4.14–5.8)
SODIUM SERPL-SCNC: 142 MMOL/L (ref 136–145)
TROPONIN T SERPL HS-MCNC: 51 NG/L
WBC NRBC COR # BLD: 8.03 10*3/MM3 (ref 3.4–10.8)
WHOLE BLOOD HOLD COAG: NORMAL
WHOLE BLOOD HOLD SPECIMEN: NORMAL

## 2023-06-01 PROCEDURE — 93005 ELECTROCARDIOGRAM TRACING: CPT

## 2023-06-01 PROCEDURE — 93005 ELECTROCARDIOGRAM TRACING: CPT | Performed by: EMERGENCY MEDICINE

## 2023-06-01 PROCEDURE — 71045 X-RAY EXAM CHEST 1 VIEW: CPT

## 2023-06-01 PROCEDURE — 85025 COMPLETE CBC W/AUTO DIFF WBC: CPT | Performed by: EMERGENCY MEDICINE

## 2023-06-01 PROCEDURE — 99284 EMERGENCY DEPT VISIT MOD MDM: CPT

## 2023-06-01 PROCEDURE — 83880 ASSAY OF NATRIURETIC PEPTIDE: CPT | Performed by: EMERGENCY MEDICINE

## 2023-06-01 PROCEDURE — 80053 COMPREHEN METABOLIC PANEL: CPT | Performed by: EMERGENCY MEDICINE

## 2023-06-01 PROCEDURE — 84484 ASSAY OF TROPONIN QUANT: CPT | Performed by: EMERGENCY MEDICINE

## 2023-06-01 RX ORDER — HYDROCORTISONE ACETATE 25 MG/1
25 SUPPOSITORY RECTAL 2 TIMES DAILY
Qty: 14 SUPPOSITORY | Refills: 0 | Status: SHIPPED | OUTPATIENT
Start: 2023-06-01 | End: 2023-06-08

## 2023-06-01 RX ORDER — HYDROCORTISONE ACETATE 25 MG/1
25 SUPPOSITORY RECTAL 2 TIMES DAILY
Qty: 14 SUPPOSITORY | Refills: 0 | Status: SHIPPED | OUTPATIENT
Start: 2023-06-01 | End: 2023-06-01 | Stop reason: SDUPTHER

## 2023-06-01 RX ORDER — SODIUM CHLORIDE 0.9 % (FLUSH) 0.9 %
10 SYRINGE (ML) INJECTION AS NEEDED
Status: DISCONTINUED | OUTPATIENT
Start: 2023-06-01 | End: 2023-06-01 | Stop reason: HOSPADM

## 2023-06-01 NOTE — ED PROVIDER NOTES
Subjective   History of Present Illness  This is a pleasant 83-year-old male patient of Dr. Silverio.  Accompanied by his daughter.  He is quite hard of hearing.  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    He has no history of GI bleeding in the past has never had EGD or colonoscopy and the only blood thinner you are on is an 81 mg aspirin a day.    He presents to the emergency department today with rectal bleeding both dark and bright red for the past 1 to 2 days.  Really got worse this morning and is brought to the ED for further evaluation.    He has quite a bit of anxiety about this is apparently his mother had hemorrhage at 1 point in her life.  However the patient has avoided any sort of GI evaluation.  His weights been stable.  There is been no fevers or chills.  Normally his bowel movements and urine are at his baseline.  He has some chronic underlying dyspnea but is not on CPAP.  I have reviewed his previous cardiac evaluations.  He has had no recent medication changes.        All other systems are reviewed and are negative except as noted above.        Review of Systems   All other systems reviewed and are negative.      Past Medical History:   Diagnosis Date   • Cataract     Left eye   • Hypertension    • Stroke    from 4/28/23 cardiology note    1. Diffuse coronary artery disease:  a. Cardiac PET, 11/23/2019: Rest EF 59%. Stress EF 58%. CT portion of the exam shows moderate amount of coronary artery calcification, predominantly in the LAD distribution Abnormal myocardial perfusion study with small amount of mild inferoapical ischemia. The study is low risk due to the small amount of ischemia detected  b. [facility], 12/11/2019, Dr. Silverio: Diffuse, nonfocal disease involving multiple small vessels as well as the small caliber LAD. EF 60%.   c. Echo, 11/06/2022: EF 55%. Left ventricular wall thickness is consistent with mild concentric  hypertrophy. Left atrial volume is moderately increased. Mild to moderate MR. Mild TR with normal RVSP.  2. Palpitations:   a. 2w Holter, 11/21/2019: PAT, NSVT, monomorphic. Single brief Mobitz 2 AVB.   3. Grade II diastolic dysfunction:  a. Echo, 10/16/2019: EF 62%. Mild-to-mod concentric LVH. Left ventricular diastolic dysfunction is noted (grade II w/high LAP) consistent with pseudonormalization.  4. Hypertension.  5. BPH.  6. Senile hyperkeratosis.     History of Present Illness  Patient presents today for a follow-up with a history of CAD, and cardiac risk factors. Since last visit, patient has continued to be short of breath but this has become his baseline.  His shortness of breath has not worsened.  He and his daughter do not feel like his shortness of air have improved with Jardiance.  Daughter is not his daughter is a nurse and states that she patient has not been volume overloaded.  She is also not 100% certain that he has been taking his medications as he is supposed to.  He is short of breath at rest and has audible wheezing.  [facility] and states that his BMP from 3/8/2023 does show a creatinine of 1.86 and a potassium of 3.6.  She believes he has been taking Lasix without potassium.  Patient does agree to this.  Allergies   Allergen Reactions   • Clonidine Hcl Shortness Of Breath   • Atenolol Itching and Unknown (See Comments)     Patient unclear of reaction   • Clonidine Derivatives    • Irbesartan Swelling   • Lisinopril Other (See Comments)     UNKNOWN   • Nifedipine    • Tetanus Toxoid GI Intolerance   • Tetanus Toxoids        Past Surgical History:   Procedure Laterality Date   • CARDIAC CATHETERIZATION Left 12/11/2019    Procedure: Left Heart Cath;  Surgeon: Chantelle Silverio MD;  Location: [facility] CATH INVASIVE LOCATION;  Service: Cardiology   • CHOLECYSTECTOMY     • JOINT REPLACEMENT Bilateral     KNEES       Family History   Problem Relation Age of Onset   • Heart disease  Mother    • Cancer Mother    • Aneurysm Father        Social History     Socioeconomic History   • Marital status:    Tobacco Use   • Smoking status: Never     Passive exposure: Never   • Smokeless tobacco: Never   Vaping Use   • Vaping Use: Never used   Substance and Sexual Activity   • Alcohol use: No   • Drug use: No   • Sexual activity: Defer           Objective   Physical Exam  Vitals and nursing note reviewed.   Constitutional:       Comments: This is a pleasant 83-year-old quite hard of hearing his daughter talks a lot for him.  He has increased respiratory rate that this is his baseline per his daughter.  Oxygen saturation and vitals are acceptable.   HENT:      Head: Normocephalic and atraumatic.      Right Ear: External ear normal.      Left Ear: External ear normal.      Nose: Nose normal.      Mouth/Throat:      Mouth: Mucous membranes are moist.      Pharynx: Oropharynx is clear.   Eyes:      Extraocular Movements: Extraocular movements intact.      Conjunctiva/sclera: Conjunctivae normal.      Pupils: Pupils are equal, round, and reactive to light.   Cardiovascular:      Rate and Rhythm: Normal rate and regular rhythm.      Pulses: Normal pulses.      Heart sounds: Normal heart sounds.   Pulmonary:      Effort: Pulmonary effort is normal.      Breath sounds: Normal breath sounds.   Abdominal:      Comments: Positive bowel sounds soft and nontender without organomegaly, masses, or guarding.  BMI is 30.   Genitourinary:     Comments: External he has couple nonthrombosed hemorrhoids.  He also has a skin tag at about the 11 o'clock position.  He has normal sphincter tone no internal mass I can feel his secretions are bright red with occasional dark spots.  This is strongly guaiac positive.  Musculoskeletal:         General: Normal range of motion.      Cervical back: Normal range of motion and neck supple.   Skin:     General: Skin is warm and dry.      Capillary Refill: Capillary refill takes less  than 2 seconds.   Neurological:      Comments: Face is symmetric, voice strong, tongue midline.  Vision, hearing, and speech are preserved.  No focal weakness.         Procedures           ED Course            Recent Results (from the past 24 hour(s))   ECG 12 Lead ED Triage Standing Order; SOA    Collection Time: 06/01/23  9:28 AM   Result Value Ref Range    QT Interval 410 ms    QTC Interval 461 ms   Comprehensive Metabolic Panel    Collection Time: 06/01/23  9:48 AM    Specimen: Blood   Result Value Ref Range    Glucose 197 (H) 65 - 99 mg/dL    BUN 9 8 - 23 mg/dL    Creatinine 1.55 (H) 0.76 - 1.27 mg/dL    Sodium 142 136 - 145 mmol/L    Potassium 3.7 3.5 - 5.2 mmol/L    Chloride 107 98 - 107 mmol/L    CO2 22.0 22.0 - 29.0 mmol/L    Calcium 9.1 8.6 - 10.5 mg/dL    Total Protein 6.1 6.0 - 8.5 g/dL    Albumin 3.7 3.5 - 5.2 g/dL    ALT (SGPT) 10 1 - 41 U/L    AST (SGOT) 12 1 - 40 U/L    Alkaline Phosphatase 85 39 - 117 U/L    Total Bilirubin 0.6 0.0 - 1.2 mg/dL    Globulin 2.4 gm/dL    A/G Ratio 1.5 g/dL    BUN/Creatinine Ratio 5.8 (L) 7.0 - 25.0    Anion Gap 13.0 5.0 - 15.0 mmol/L    eGFR 43.6 (L) >60.0 mL/min/1.73   BNP    Collection Time: 06/01/23  9:48 AM    Specimen: Blood   Result Value Ref Range    proBNP 1,057.0 0.0 - 1,800.0 pg/mL   Single High Sensitivity Troponin T    Collection Time: 06/01/23  9:48 AM    Specimen: Blood   Result Value Ref Range    HS Troponin T 51 (H) <15 ng/L   Green Top (Gel)    Collection Time: 06/01/23  9:48 AM   Result Value Ref Range    Extra Tube Hold for add-ons.    Lavender Top    Collection Time: 06/01/23  9:48 AM   Result Value Ref Range    Extra Tube hold for add-on    Gold Top - SST    Collection Time: 06/01/23  9:48 AM   Result Value Ref Range    Extra Tube Hold for add-ons.    Gray Top    Collection Time: 06/01/23  9:48 AM   Result Value Ref Range    Extra Tube Hold for add-ons.    Light Blue Top    Collection Time: 06/01/23  9:48 AM   Result Value Ref Range    Extra Tube  "Hold for add-ons.    CBC Auto Differential    Collection Time: 06/01/23  9:48 AM    Specimen: Blood   Result Value Ref Range    WBC 8.03 3.40 - 10.80 10*3/mm3    RBC 5.16 4.14 - 5.80 10*6/mm3    Hemoglobin 15.1 13.0 - 17.7 g/dL    Hematocrit 45.7 37.5 - 51.0 %    MCV 88.6 79.0 - 97.0 fL    MCH 29.3 26.6 - 33.0 pg    MCHC 33.0 31.5 - 35.7 g/dL    RDW 12.9 12.3 - 15.4 %    RDW-SD 42.2 37.0 - 54.0 fl    MPV 10.2 6.0 - 12.0 fL    Platelets 195 140 - 450 10*3/mm3    Neutrophil % 75.3 42.7 - 76.0 %    Lymphocyte % 11.7 (L) 19.6 - 45.3 %    Monocyte % 6.2 5.0 - 12.0 %    Eosinophil % 5.7 0.3 - 6.2 %    Basophil % 0.9 0.0 - 1.5 %    Immature Grans % 0.2 0.0 - 0.5 %    Neutrophils, Absolute 6.04 1.70 - 7.00 10*3/mm3    Lymphocytes, Absolute 0.94 0.70 - 3.10 10*3/mm3    Monocytes, Absolute 0.50 0.10 - 0.90 10*3/mm3    Eosinophils, Absolute 0.46 (H) 0.00 - 0.40 10*3/mm3    Basophils, Absolute 0.07 0.00 - 0.20 10*3/mm3    Immature Grans, Absolute 0.02 0.00 - 0.05 10*3/mm3    nRBC 0.0 0.0 - 0.2 /100 WBC     Note: In addition to lab results from this visit, the labs listed above may include labs taken at another facility or during a different encounter within the last 24 hours. Please correlate lab times with ED admission and discharge times for further clarification of the services performed during this visit.    XR Chest 1 View   Final Result   Impression:   Evidence of possible developing infiltrates in the lung bases suggesting possible bibasilar pneumonia. Recommend correlation for signs or symptoms of acute infection and follow-up to ensure resolution.         Electronically Signed: Diomedes Garduno     6/1/2023 10:22 AM EDT     Workstation ID: [id number]        Vitals:    06/01/23 0920 06/01/23 1000 06/01/23 1030 06/01/23 1100   BP: 160/89 139/85 136/83 123/78   Pulse: 77 65 63 61   Resp: 24      Temp: 97.4 °F (36.3 °C)      SpO2: 97% 96% 96% 95%   Weight: 87.1 kg (192 lb)      Height: 170.2 cm (67\")        Medications - No " data to display  ECG/EMG Results (last 24 hours)     Procedure Component Value Units Date/Time    ECG 12 Lead ED Triage Standing Order; SOA [[id number]] Collected: 06/01/23 0928     Updated: 06/01/23 0946     QT Interval 410 ms      QTC Interval 461 ms     Narrative:      Test Reason : ED Triage Standing Order~  Blood Pressure :   */*   mmHG  Vent. Rate :  76 BPM     Atrial Rate :  76 BPM     P-R Int : 158 ms          QRS Dur :  88 ms      QT Int : 410 ms       P-R-T Axes :  37  56  16 degrees     QTc Int : 461 ms    Normal sinus rhythm  Normal ECG  When compared with ECG of 20-OCT-2022 12:20,  premature atrial complexes are no longer present    Referred By: EDMD           Confirmed By:         ECG 12 Lead ED Triage Standing Order; SOA   Preliminary Result   Test Reason : ED Triage Standing Order~   Blood Pressure :   */*   mmHG   Vent. Rate :  76 BPM     Atrial Rate :  76 BPM      P-R Int : 158 ms          QRS Dur :  88 ms       QT Int : 410 ms       P-R-T Axes :  37  56  16 degrees      QTc Int : 461 ms      Normal sinus rhythm   Normal ECG   When compared with ECG of 20-OCT-2022 12:20,   premature atrial complexes are no longer present      Referred By: EDMD           Confirmed By:                                           Medical Decision Making        I have reviewed all available studies at the bedside with the patient and his daughter.  Personally reviewed his chest x-ray and read the radiology report but I favor more of a fibrotic process probably chronic due to the patient's multiple years of inhalation.  They actually have [facility].    In regards to his hematochezia this is acute and actually.  The patient's report is improving.  He has never had screening colonoscopy.  However his H&H is actually slightly higher than his baseline and I think he is in good shape from this.    We discussed differential diagnosis and most likely this represents hemorrhoidal  bleed versus diverticular bleed.    This point we discussed hospitalization but he would prefer to go home and I think this is very reasonable.  He is on an aspirin a day and have him take this off for a week.  I will treat him with Anusol HC suppositories to see if this helps.  I have referred him to GI and have asked his daughter to call to arrange follow-up.  Discussed indications for return including increased bleeding or if he has any abdominal discomfort or feels worse in any way.  Of also asked him to follow-up with his PCP for his elevated glucose and renal insufficiency.    All are agreeable with the plan    Chronic renal insufficiency, stage 3 (moderate): chronic illness or injury with exacerbation, progression, or side effects of treatment  Elevated glucose: chronic illness or injury with exacerbation, progression, or side effects of treatment  Hemorrhoids, unspecified hemorrhoid type: complicated acute illness or injury with systemic symptoms that poses a threat to life or bodily functions  Lower GI bleed: complicated acute illness or injury with systemic symptoms that poses a threat to life or bodily functions  Pulmonary fibrosis: undiagnosed new problem with uncertain prognosis  Amount and/or Complexity of Data Reviewed  Independent Historian: caregiver  External Data Reviewed: notes.  Labs: ordered. Decision-making details documented in ED Course.  Radiology: ordered and independent interpretation performed. Decision-making details documented in ED Course.  ECG/medicine tests: ordered and independent interpretation performed. Decision-making details documented in ED Course.      Risk  Prescription drug management.          Final diagnoses:   Lower GI bleed   Hemorrhoids, unspecified hemorrhoid type   Chronic renal insufficiency, stage 3 (moderate)   Elevated glucose   Pulmonary fibrosis       ED Disposition  ED Disposition     ED Disposition   Discharge    Condition   Stable    Comment   --              Ashleigh Cornejo MD  [address]  [place] [id number] [phone]    Schedule an appointment as soon as possible for a visit            Medication List      New Prescriptions    hydrocortisone 25 MG suppository  Commonly known as: ANUSOL-HC  Insert 1 suppository into the rectum 2 (Two) Times a Day for 7 days.           Where to Get Your Medications      These medications were sent to [facility] [id number] - [address] [phone] [facility] [phone] [place] [address]    Phone: [phone]   · hydrocortisone 25 MG suppository          Weston Yoo MD  06/01/23 3150

## 2023-06-01 NOTE — DISCHARGE INSTRUCTIONS
stop your aspirin for 7 days        Please call ASAP to arrange outpatient follow up with one of the following gastroenterologists:    [facility] Provider Dr. Jerry Moreland, Dr. LULA Rivera, Dr. Sandeep Teran, LILY Alegre    Office Location:  [address] [place] [address]    Office # [phone]    Or    [facility] Provider Dr. Blake Guillory and Dr. John Patel    Office Location:   [address] [place] [address]    Office # [phone]

## 2023-06-06 ENCOUNTER — OFFICE VISIT (OUTPATIENT)
Dept: PULMONOLOGY | Facility: CLINIC | Age: 83
End: 2023-06-06
Payer: MEDICARE

## 2023-06-06 VITALS
HEIGHT: 67 IN | BODY MASS INDEX: 30.18 KG/M2 | HEART RATE: 82 BPM | WEIGHT: 192.3 LBS | TEMPERATURE: 98 F | DIASTOLIC BLOOD PRESSURE: 66 MMHG | OXYGEN SATURATION: 96 % | SYSTOLIC BLOOD PRESSURE: 106 MMHG

## 2023-06-06 DIAGNOSIS — R06.09 DYSPNEA ON EXERTION: Primary | ICD-10-CM

## 2023-06-06 DIAGNOSIS — I51.89 GRADE II DIASTOLIC DYSFUNCTION: ICD-10-CM

## 2023-06-06 DIAGNOSIS — I25.10 NON-OCCLUSIVE CORONARY ARTERY DISEASE: ICD-10-CM

## 2023-06-06 NOTE — PROGRESS NOTES
PULMONARY  NOTE    Chief Complaint     Dyspnea on exertion, non-smoker, diastolic dysfunction, coronary artery disease, hypertension, BPH    History of Present Illness     83-year-old male referred for evaluation of shortness of breath    He has had shortness of breath which is gotten progressively worse  He has dyspnea and wheezing with exertion    He lives alone and can get around his home on his own for the most part but uses a wheelchair if he goes any distance, such as in our office today    He has a history of diastolic dysfunction as well as chronic kidney disease  They have not noted increasing lower extremity edema    He denies chest pain or palpitations    Was in the emergency room few days ago with increasing symptoms  Chest x-ray revealed a prominent interstitial pattern without consolidation or effusions    Most recent echocardiogram as per the chart    Patient Active Problem List   Diagnosis    Senile hyperkeratosis    Stroke    Hypertension    Dyspnea on exertion    Palpitations    Other fatigue    BPH (benign prostatic hyperplasia)    Grade II diastolic dysfunction    Non-occlusive coronary artery disease    Dyslipidemia     Allergies   Allergen Reactions    Clonidine Hcl Shortness Of Breath    Atenolol Itching and Unknown (See Comments)     Patient unclear of reaction    Clonidine Derivatives     Irbesartan Swelling    Lisinopril Other (See Comments)     UNKNOWN    Nifedipine     Tetanus Toxoid GI Intolerance    Tetanus Toxoids        Current Outpatient Medications:     aspirin 81 MG tablet, Take 1 tablet by mouth Daily., Disp: , Rfl:     diphenhydrAMINE HCl (BENADRYL ALLERGY PO), Take 25 mg by mouth 2 (Two) Times a Day., Disp: , Rfl:     doxazosin (CARDURA) 8 MG tablet, TAKE 1 TABLET BY MOUTH EVERY NIGHT., Disp: 90 tablet, Rfl: 0    empagliflozin (Jardiance) 10 MG tablet tablet, Take 1 tablet by mouth Daily., Disp: 90 tablet, Rfl: 3    furosemide (LASIX) 20 MG tablet, Take 1 tablet by mouth  "Daily. TAKE ONE TABLET DAILY AS NEEDED FOR FLUID RETENTION, Disp: 90 tablet, Rfl: 0    hydrocortisone (ANUSOL-HC) 25 MG suppository, Insert 1 suppository into the rectum 2 (Two) Times a Day for 7 days., Disp: 14 suppository, Rfl: 0    isosorbide mononitrate (IMDUR) 30 MG 24 hr tablet, TAKE 1 TABLET EVERY DAY, Disp: 90 tablet, Rfl: 3    potassium chloride (MICRO-K) 10 MEQ CR capsule, TAKE 1 CAPSULE EVERY DAY (WITH LASIX) AS NEEDED, Disp: 90 capsule, Rfl: 0    pravastatin (PRAVACHOL) 20 MG tablet, TAKE 1 TABLET EVERY DAY, Disp: 30 tablet, Rfl: 0  MEDICATION LIST AND ALLERGIES REVIEWED.    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    Review of Systems  IF PRESENT REFER TO SCANNED ROS SHEET FROM SAME DATE  OTHERWISE ROS OBTAINED AND NON-CONTRIBUTORY OVER HPI.    /66 (BP Location: Right arm, Patient Position: Sitting, Cuff Size: Adult)   Pulse 82   Temp 98 °F (36.7 °C)   Ht 170.2 cm (67\")   Wt 87.2 kg (192 lb 4.8 oz)   SpO2 96% Comment: Room air at rest  BMI 30.12 kg/m²   Physical Exam  Vitals and nursing note reviewed.   Constitutional:       General: He is not in acute distress.     Appearance: He is well-developed. He is not diaphoretic.   HENT:      Head: Normocephalic and atraumatic.   Neck:      Thyroid: No thyromegaly.   Cardiovascular:      Rate and Rhythm: Normal rate and regular rhythm.      Heart sounds: Normal heart sounds. No murmur heard.  Pulmonary:      Effort: Pulmonary effort is normal.      Breath sounds: Normal breath sounds. No stridor.   Musculoskeletal:      Comments: Trace " pitting lower extremity edema   Lymphadenopathy:      Cervical: No cervical adenopathy.      Upper Body:      Right upper body: No supraclavicular or epitrochlear adenopathy.      Left upper body: No supraclavicular or epitrochlear adenopathy.   Skin:     General: Skin is warm and dry.   Neurological:      Mental Status: He is alert.      Comments: Patient got around the office today in a wheelchair   Psychiatric:         Behavior: Behavior normal.       Results     Chest x-ray from January 1, 2023 reviewed on PACS and revealed cardiomegaly without without consolidation or effusion    PFTs reveal no airway obstruction, no restriction and a normal diffusion capacity    Immunization History   Administered Date(s) Administered    COVID-19 (PFIZER) BIVALENT 12+YRS 09/26/2022    COVID-19 (PFIZER) Purple Cap Monovalent 09/24/2021    Covid-19 (Pfizer) Gray Cap Monovalent 04/04/2022     Problem List       ICD-10-CM ICD-9-CM   1. Dyspnea on exertion  R06.09 786.09   2. Grade II diastolic dysfunction  I51.89 429.9   3. Non-occlusive coronary artery disease  I25.10 414.00       Discussion     We reviewed his test results  Chest x-ray and PFTs are unremarkable  No obvious obstructive or restrictive defect as a contributing factor to his dyspnea    I suspect that several factors including deconditioning and chronic diastolic heart failure are contributing factors to his shortness of breath  Control of volume is good to be difficult with his chronic kidney disease    We discussed salt and water moderation    He may have underlying obstructive sleep apnea but neither his daughter and her he think he would be willing/able to wear a mask  We will check 2 nights of nocturnal pulse oximetry  We will arrange supplemental oxygen if he desaturates    I will just plan to see him back in a couple of months for follow-up    Moderate level of Medical Decision Making complexity based on 1 undiagnosed new problem or 2 stable chronic  conditions, independent interpretation of tests, and/or prescription drug management     Gianluca Mckee MD  Note electronically signed    CC: Ashleigh Cornejo MD

## 2023-07-20 ENCOUNTER — APPOINTMENT (OUTPATIENT)
Dept: CT IMAGING | Facility: HOSPITAL | Age: 83
End: 2023-07-20
Payer: MEDICARE

## 2023-07-20 ENCOUNTER — HOSPITAL ENCOUNTER (OUTPATIENT)
Facility: HOSPITAL | Age: 83
Setting detail: OBSERVATION
Discharge: HOME | End: 2023-07-21
Attending: EMERGENCY MEDICINE | Admitting: INTERNAL MEDICINE
Payer: MEDICARE

## 2023-07-20 ENCOUNTER — APPOINTMENT (OUTPATIENT)
Dept: GENERAL RADIOLOGY | Facility: HOSPITAL | Age: 83
End: 2023-07-20
Payer: MEDICARE

## 2023-07-20 DIAGNOSIS — I48.0 PAROXYSMAL ATRIAL FIBRILLATION WITH RAPID VENTRICULAR RESPONSE: Primary | ICD-10-CM

## 2023-07-20 DIAGNOSIS — I50.9 ACUTE ON CHRONIC CONGESTIVE HEART FAILURE, UNSPECIFIED HEART FAILURE TYPE: ICD-10-CM

## 2023-07-20 DIAGNOSIS — I10 ELEVATED BLOOD PRESSURE READING WITH DIAGNOSIS OF HYPERTENSION: ICD-10-CM

## 2023-07-20 DIAGNOSIS — G89.29 CHRONIC LEFT HIP PAIN: ICD-10-CM

## 2023-07-20 DIAGNOSIS — M25.552 CHRONIC LEFT HIP PAIN: ICD-10-CM

## 2023-07-20 PROBLEM — I48.91 ATRIAL FIBRILLATION WITH RVR: Status: ACTIVE | Noted: 2023-07-20

## 2023-07-20 LAB
ALBUMIN SERPL-MCNC: 3.8 G/DL (ref 3.5–5.2)
ALBUMIN/GLOB SERPL: 1.5 G/DL
ALP SERPL-CCNC: 88 U/L (ref 39–117)
ALT SERPL W P-5'-P-CCNC: 11 U/L (ref 1–41)
ANION GAP SERPL CALCULATED.3IONS-SCNC: 13 MMOL/L (ref 5–15)
AST SERPL-CCNC: 12 U/L (ref 1–40)
BASOPHILS # BLD AUTO: 0.05 10*3/MM3 (ref 0–0.2)
BASOPHILS NFR BLD AUTO: 0.6 % (ref 0–1.5)
BILIRUB SERPL-MCNC: 0.8 MG/DL (ref 0–1.2)
BUN SERPL-MCNC: 13 MG/DL (ref 8–23)
BUN/CREAT SERPL: 8.2 (ref 7–25)
CALCIUM SPEC-SCNC: 9.2 MG/DL (ref 8.6–10.5)
CHLORIDE SERPL-SCNC: 103 MMOL/L (ref 98–107)
CO2 SERPL-SCNC: 26 MMOL/L (ref 22–29)
CREAT SERPL-MCNC: 1.58 MG/DL (ref 0.76–1.27)
DEPRECATED RDW RBC AUTO: 42.3 FL (ref 37–54)
EGFRCR SERPLBLD CKD-EPI 2021: 42.6 ML/MIN/1.73
EOSINOPHIL # BLD AUTO: 0.4 10*3/MM3 (ref 0–0.4)
EOSINOPHIL NFR BLD AUTO: 5.1 % (ref 0.3–6.2)
ERYTHROCYTE [DISTWIDTH] IN BLOOD BY AUTOMATED COUNT: 13.5 % (ref 12.3–15.4)
GEN 5 2HR TROPONIN T REFLEX: 44 NG/L
GLOBULIN UR ELPH-MCNC: 2.6 GM/DL
GLUCOSE SERPL-MCNC: 132 MG/DL (ref 65–99)
HCT VFR BLD AUTO: 47.1 % (ref 37.5–51)
HGB BLD-MCNC: 15.3 G/DL (ref 13–17.7)
HOLD SPECIMEN: NORMAL
IMM GRANULOCYTES # BLD AUTO: 0.03 10*3/MM3 (ref 0–0.05)
IMM GRANULOCYTES NFR BLD AUTO: 0.4 % (ref 0–0.5)
LYMPHOCYTES # BLD AUTO: 1.17 10*3/MM3 (ref 0.7–3.1)
LYMPHOCYTES NFR BLD AUTO: 14.8 % (ref 19.6–45.3)
MAGNESIUM SERPL-MCNC: 2.1 MG/DL (ref 1.6–2.4)
MCH RBC QN AUTO: 28.1 PG (ref 26.6–33)
MCHC RBC AUTO-ENTMCNC: 32.5 G/DL (ref 31.5–35.7)
MCV RBC AUTO: 86.4 FL (ref 79–97)
MONOCYTES # BLD AUTO: 0.62 10*3/MM3 (ref 0.1–0.9)
MONOCYTES NFR BLD AUTO: 7.9 % (ref 5–12)
NEUTROPHILS NFR BLD AUTO: 5.61 10*3/MM3 (ref 1.7–7)
NEUTROPHILS NFR BLD AUTO: 71.2 % (ref 42.7–76)
NRBC BLD AUTO-RTO: 0 /100 WBC (ref 0–0.2)
NT-PROBNP SERPL-MCNC: 2650 PG/ML (ref 0–1800)
PLATELET # BLD AUTO: 224 10*3/MM3 (ref 140–450)
PMV BLD AUTO: 9.8 FL (ref 6–12)
POTASSIUM SERPL-SCNC: 4 MMOL/L (ref 3.5–5.2)
PROT SERPL-MCNC: 6.4 G/DL (ref 6–8.5)
QT INTERVAL: 340 MS
QTC INTERVAL: 472 MS
RBC # BLD AUTO: 5.45 10*6/MM3 (ref 4.14–5.8)
SODIUM SERPL-SCNC: 142 MMOL/L (ref 136–145)
TROPONIN T DELTA: -15 NG/L
TROPONIN T SERPL HS-MCNC: 59 NG/L
TSH SERPL DL<=0.05 MIU/L-ACNC: 2.4 UIU/ML (ref 0.27–4.2)
WBC NRBC COR # BLD: 7.88 10*3/MM3 (ref 3.4–10.8)
WHOLE BLOOD HOLD COAG: NORMAL
WHOLE BLOOD HOLD SPECIMEN: NORMAL

## 2023-07-20 PROCEDURE — 84484 ASSAY OF TROPONIN QUANT: CPT | Performed by: EMERGENCY MEDICINE

## 2023-07-20 PROCEDURE — G0378 HOSPITAL OBSERVATION PER HR: HCPCS

## 2023-07-20 PROCEDURE — 83735 ASSAY OF MAGNESIUM: CPT | Performed by: EMERGENCY MEDICINE

## 2023-07-20 PROCEDURE — 80053 COMPREHEN METABOLIC PANEL: CPT | Performed by: EMERGENCY MEDICINE

## 2023-07-20 PROCEDURE — 83880 ASSAY OF NATRIURETIC PEPTIDE: CPT | Performed by: EMERGENCY MEDICINE

## 2023-07-20 PROCEDURE — 36415 COLL VENOUS BLD VENIPUNCTURE: CPT

## 2023-07-20 PROCEDURE — 85025 COMPLETE CBC W/AUTO DIFF WBC: CPT | Performed by: EMERGENCY MEDICINE

## 2023-07-20 PROCEDURE — 96375 TX/PRO/DX INJ NEW DRUG ADDON: CPT

## 2023-07-20 PROCEDURE — 84443 ASSAY THYROID STIM HORMONE: CPT | Performed by: EMERGENCY MEDICINE

## 2023-07-20 PROCEDURE — 71045 X-RAY EXAM CHEST 1 VIEW: CPT

## 2023-07-20 PROCEDURE — 25010000002 LORAZEPAM PER 2 MG: Performed by: NURSE PRACTITIONER

## 2023-07-20 PROCEDURE — 93005 ELECTROCARDIOGRAM TRACING: CPT | Performed by: EMERGENCY MEDICINE

## 2023-07-20 PROCEDURE — 96374 THER/PROPH/DIAG INJ IV PUSH: CPT

## 2023-07-20 PROCEDURE — 99285 EMERGENCY DEPT VISIT HI MDM: CPT

## 2023-07-20 PROCEDURE — 72192 CT PELVIS W/O DYE: CPT

## 2023-07-20 RX ORDER — ASPIRIN 81 MG/1
81 TABLET ORAL DAILY
Status: DISCONTINUED | OUTPATIENT
Start: 2023-07-21 | End: 2023-07-21

## 2023-07-20 RX ORDER — BISACODYL 5 MG/1
5 TABLET, DELAYED RELEASE ORAL DAILY PRN
Status: DISCONTINUED | OUTPATIENT
Start: 2023-07-20 | End: 2023-07-21 | Stop reason: HOSPADM

## 2023-07-20 RX ORDER — ACETAMINOPHEN 160 MG/5ML
650 SOLUTION ORAL EVERY 4 HOURS PRN
Status: DISCONTINUED | OUTPATIENT
Start: 2023-07-20 | End: 2023-07-21 | Stop reason: HOSPADM

## 2023-07-20 RX ORDER — NITROGLYCERIN 0.4 MG/1
0.4 TABLET SUBLINGUAL
Status: DISCONTINUED | OUTPATIENT
Start: 2023-07-20 | End: 2023-07-21 | Stop reason: HOSPADM

## 2023-07-20 RX ORDER — SODIUM CHLORIDE 0.9 % (FLUSH) 0.9 %
10 SYRINGE (ML) INJECTION AS NEEDED
Status: DISCONTINUED | OUTPATIENT
Start: 2023-07-20 | End: 2023-07-21 | Stop reason: HOSPADM

## 2023-07-20 RX ORDER — LORAZEPAM 2 MG/ML
0.25 INJECTION INTRAMUSCULAR ONCE
Status: COMPLETED | OUTPATIENT
Start: 2023-07-21 | End: 2023-07-20

## 2023-07-20 RX ORDER — ACETAMINOPHEN 650 MG/1
650 SUPPOSITORY RECTAL EVERY 4 HOURS PRN
Status: DISCONTINUED | OUTPATIENT
Start: 2023-07-20 | End: 2023-07-21 | Stop reason: HOSPADM

## 2023-07-20 RX ORDER — AMOXICILLIN 250 MG
2 CAPSULE ORAL 2 TIMES DAILY
Status: DISCONTINUED | OUTPATIENT
Start: 2023-07-21 | End: 2023-07-21 | Stop reason: HOSPADM

## 2023-07-20 RX ORDER — ISOSORBIDE MONONITRATE 30 MG/1
30 TABLET, EXTENDED RELEASE ORAL DAILY
Status: DISCONTINUED | OUTPATIENT
Start: 2023-07-21 | End: 2023-07-21 | Stop reason: HOSPADM

## 2023-07-20 RX ORDER — TERAZOSIN 5 MG/1
10 CAPSULE ORAL NIGHTLY
Status: DISCONTINUED | OUTPATIENT
Start: 2023-07-21 | End: 2023-07-21 | Stop reason: HOSPADM

## 2023-07-20 RX ORDER — LIDOCAINE 50 MG/G
1 PATCH TOPICAL
Status: DISCONTINUED | OUTPATIENT
Start: 2023-07-21 | End: 2023-07-21 | Stop reason: HOSPADM

## 2023-07-20 RX ORDER — PRAVASTATIN SODIUM 20 MG
20 TABLET ORAL NIGHTLY
Status: DISCONTINUED | OUTPATIENT
Start: 2023-07-20 | End: 2023-07-21 | Stop reason: HOSPADM

## 2023-07-20 RX ORDER — BUMETANIDE 0.25 MG/ML
2 INJECTION INTRAMUSCULAR; INTRAVENOUS ONCE
Status: COMPLETED | OUTPATIENT
Start: 2023-07-20 | End: 2023-07-20

## 2023-07-20 RX ORDER — BISACODYL 10 MG
10 SUPPOSITORY, RECTAL RECTAL DAILY PRN
Status: DISCONTINUED | OUTPATIENT
Start: 2023-07-20 | End: 2023-07-21 | Stop reason: HOSPADM

## 2023-07-20 RX ORDER — ACETAMINOPHEN 325 MG/1
650 TABLET ORAL EVERY 4 HOURS PRN
Status: DISCONTINUED | OUTPATIENT
Start: 2023-07-20 | End: 2023-07-21 | Stop reason: HOSPADM

## 2023-07-20 RX ORDER — SODIUM CHLORIDE 0.9 % (FLUSH) 0.9 %
10 SYRINGE (ML) INJECTION EVERY 12 HOURS SCHEDULED
Status: DISCONTINUED | OUTPATIENT
Start: 2023-07-21 | End: 2023-07-21 | Stop reason: HOSPADM

## 2023-07-20 RX ORDER — SODIUM CHLORIDE 9 MG/ML
40 INJECTION, SOLUTION INTRAVENOUS AS NEEDED
Status: DISCONTINUED | OUTPATIENT
Start: 2023-07-20 | End: 2023-07-21 | Stop reason: HOSPADM

## 2023-07-20 RX ORDER — POLYETHYLENE GLYCOL 3350 17 G/17G
17 POWDER, FOR SOLUTION ORAL DAILY PRN
Status: DISCONTINUED | OUTPATIENT
Start: 2023-07-20 | End: 2023-07-21 | Stop reason: HOSPADM

## 2023-07-20 RX ADMIN — LORAZEPAM 0.25 MG: 2 INJECTION INTRAMUSCULAR; INTRAVENOUS at 23:23

## 2023-07-20 RX ADMIN — BUMETANIDE 2 MG: 0.25 INJECTION, SOLUTION INTRAMUSCULAR; INTRAVENOUS at 17:56

## 2023-07-20 RX ADMIN — NITROGLYCERIN 1 INCH: 20 OINTMENT TOPICAL at 17:01

## 2023-07-20 NOTE — ED PROVIDER NOTES
Subjective   History of Present Illness  Patient presents for evaluation of tachycardia and irregular cardiac rhythm.  [facility] [address], where he was following up for posterior left thigh pain that started when he got out of bed 2 days ago.  He stated that he only has pain when ambulating.  Normally patient ambulates without assistance, but he has been ambulating with assistance of walking cane or rolling walker since onset.  Patient reported that he feels baseline otherwise.  He stated that plain films of the hip and low back have been performed and he was told that pain is due to arthritic changes in his lumbar spine.  Although, he reported that he has been slightly more fatigued over the past 3 months.  Patient stated his pulse often runs around 100, but he denied always having an irregular rhythm. he denied chest pain, dizziness, shortness of breath, recent illness, and fevers.    History provided by:  Patient, EMS personnel and relative    Review of Systems    Past Medical History:   Diagnosis Date    Cataract     Left eye    Hypertension     Stroke        Allergies   Allergen Reactions    Clonidine Hcl Shortness Of Breath    Atenolol Itching and Unknown (See Comments)     Patient unclear of reaction    Clonidine Derivatives     Irbesartan Swelling    Lisinopril Other (See Comments)     UNKNOWN    Nifedipine     Tetanus Toxoid GI Intolerance    Tetanus Toxoids        Past Surgical History:   Procedure Laterality Date    CARDIAC CATHETERIZATION Left 12/11/2019    Procedure: Left Heart Cath;  Surgeon: Chantelle Silverio MD;  Location: [facility] CATH INVASIVE LOC[facility];  Service: Cardiology    CHOLECYSTECTOMY      JOINT REPLACEMENT Bilateral     KNEES       Family History   Problem Relation Age of Onset    Heart disease Mother     Cancer Mother     Aneurysm Father        Social History     Socioeconomic History    Marital status:    Tobacco Use    Smoking status: Never      Passive exposure: Never    Smokeless tobacco: Never   Vaping Use    Vaping Use: Never used   Substance and Sexual Activity    Alcohol use: No    Drug use: No    Sexual activity: Defer           Objective   Physical Exam  Vitals and nursing note reviewed.   Constitutional:       General: He is not in acute distress.     Appearance: Normal appearance. He is not toxic-appearing.   HENT:      Head: Normocephalic and atraumatic.   Eyes:      Extraocular Movements: Extraocular movements intact.      Pupils: Pupils are equal, round, and reactive to light.   Cardiovascular:      Rate and Rhythm: Tachycardia present. Rhythm irregularly irregular.   Pulmonary:      Effort: Pulmonary effort is normal. No respiratory distress.      Breath sounds: No wheezing, rhonchi or rales.      Comments: Increased work of breathing that is baseline per patient and electronic medical record corroborates this   Musculoskeletal:      Cervical back: Normal range of motion and neck supple.   Skin:     General: Skin is warm and dry.      Capillary Refill: Capillary refill takes less than 2 seconds.   Neurological:      General: No focal deficit present.      Mental Status: He is alert and oriented to person, place, and time.   Psychiatric:         Mood and Affect: Mood normal.         Behavior: Behavior normal.         Thought Content: Thought content normal.       Procedures           ED Course  ED Course as of 07/20/23 1828   Thu Jul 20, 2023   1636 Personally reviewed the patient's cardiac catheterization from 2019.  At that point the patient had diffuse mild to moderate disease which was not amenable to angioplasty, stent, or surgical intervention.  At that point, they recommended medical management. [RS]   1642 Heart Rate: 110  Monitor demonstrates atrial fibrillation with rapid ventricular response.  Patient is a reportedly chronic atrial fibrillation. [RS]   1642 ECG 12 Lead ED Triage Standing Order; Dysrhythmia  Review of the EKG  demonstrates that the patient is typically in a normal sinus rhythm.  Last EKG available prior to today was in June. [RS]   1643 On medication review I do not see that the patient is on anticoagulant. [RS]   1746 Creatinine(!): 1.58  Stable chronic kidney disease when compared to prior. [RS]   1746 Single High Sensitivity Troponin T(!!)  Stainable chronically elevated when compared to prior [RS]   1755 XR Chest 1 View  Personally reviewed the single view the chest.  On my interpretation there is no focal lobar infiltrate.  See report from radiology for details [RS]   1813 CT Pelvis Without Contrast  Personally reviewed the CT scan of the pelvis.  On my interpretation there is degenerative changes but no acute fracture or dislocation.  See report for radiology for details. [RS]   1813 Patient with evidence of paroxysmal atrial fibrillation.  Rate has maintained around 100 to 110 bpm.  Thus, I am not pursued any rate control at this point.  Patient is only on aspirin per chart review.  Patient also with evidence of CHF exacerbation.  Diuretics have been administered.  We will plan admission for further evaluation and management. [RS]   1818 I talked with the patient's daughter who is at bedside.  I discussed the atrial fibrillation with RVR with heart failure with options for management.  She states that they do want some limited care and do not want significant invasive measures, major surgeries, or life support.  However, after discussion of risks and benefits and consideration of further evaluation and cardioversion, she is supportive of that action.  We will plan admission for further evaluation and management.  Hospitalist messaged for admission. [RS]      ED Course User Index  [RS] Lenny Strickland MD                                           Medical Decision Making  Problems Addressed:  Acute on chronic congestive heart failure, unspecified heart failure type: complicated acute illness or injury  Chronic  left hip pain: complicated acute illness or injury  Elevated blood pressure reading with diagnosis of hypertension: complicated acute illness or injury  Paroxysmal atrial fibrillation with rapid ventricular response: complicated acute illness or injury    Amount and/or Complexity of Data Reviewed  Independent Historian: caregiver and EMS  External Data Reviewed: labs.  Labs: ordered. Decision-making details documented in ED Course.  Radiology: ordered. Decision-making details documented in ED Course.  ECG/medicine tests: ordered. Decision-making details documented in ED Course.    Risk  Prescription drug management.  Decision regarding hospitalization.        Final diagnoses:   Paroxysmal atrial fibrillation with rapid ventricular response   Acute on chronic congestive heart failure, unspecified heart failure type   Elevated blood pressure reading with diagnosis of hypertension   Chronic left hip pain       ED Disposition  ED Disposition       ED Disposition   Decision to Admit    Condition   --    Comment   Level of Care: Telemetry [5]   Diagnosis: CHF (congestive heart failure) [359533]                 No follow-up provider specified.       Medication List      No changes were made to your prescriptions during this visit.            Lenny Strickland MD  07/20/23 6789

## 2023-07-21 ENCOUNTER — APPOINTMENT (OUTPATIENT)
Dept: CARDIOLOGY | Facility: HOSPITAL | Age: 83
End: 2023-07-21
Payer: MEDICARE

## 2023-07-21 VITALS
HEART RATE: 111 BPM | WEIGHT: 177.25 LBS | DIASTOLIC BLOOD PRESSURE: 88 MMHG | TEMPERATURE: 97.5 F | BODY MASS INDEX: 27.82 KG/M2 | HEIGHT: 67 IN | OXYGEN SATURATION: 95 % | SYSTOLIC BLOOD PRESSURE: 111 MMHG | RESPIRATION RATE: 22 BRPM

## 2023-07-21 LAB
ALBUMIN SERPL-MCNC: 3.7 G/DL (ref 3.5–5.2)
ALBUMIN/GLOB SERPL: 1.4 G/DL
ALP SERPL-CCNC: 88 U/L (ref 39–117)
ALT SERPL W P-5'-P-CCNC: 10 U/L (ref 1–41)
ANION GAP SERPL CALCULATED.3IONS-SCNC: 11 MMOL/L (ref 5–15)
AST SERPL-CCNC: 11 U/L (ref 1–40)
BASOPHILS # BLD AUTO: 0.07 10*3/MM3 (ref 0–0.2)
BASOPHILS NFR BLD AUTO: 0.8 % (ref 0–1.5)
BH CV ECHO MEAS - AO MAX PG: 6 MMHG
BH CV ECHO MEAS - AO MEAN PG: 3.4 MMHG
BH CV ECHO MEAS - AO ROOT DIAM: 3.5 CM
BH CV ECHO MEAS - AO V2 MAX: 119 CM/SEC
BH CV ECHO MEAS - AO V2 VTI: 17.9 CM
BH CV ECHO MEAS - AVA(I,D): 1.98 CM2
BH CV ECHO MEAS - EDV(CUBED): 79.5 ML
BH CV ECHO MEAS - EDV(MOD-SP2): 82.1 ML
BH CV ECHO MEAS - EDV(MOD-SP4): 86.8 ML
BH CV ECHO MEAS - EF(MOD-BP): 36.7 %
BH CV ECHO MEAS - EF(MOD-SP2): 35.1 %
BH CV ECHO MEAS - EF(MOD-SP4): 38.4 %
BH CV ECHO MEAS - ESV(CUBED): 46.7 ML
BH CV ECHO MEAS - ESV(MOD-SP2): 53.3 ML
BH CV ECHO MEAS - ESV(MOD-SP4): 53.5 ML
BH CV ECHO MEAS - FS: 16.3 %
BH CV ECHO MEAS - IVS/LVPW: 1 CM
BH CV ECHO MEAS - IVSD: 1.4 CM
BH CV ECHO MEAS - LA DIMENSION: 4 CM
BH CV ECHO MEAS - LAT PEAK E' VEL: 10.3 CM/SEC
BH CV ECHO MEAS - LV MASS(C)D: 232.2 GRAMS
BH CV ECHO MEAS - LV MAX PG: 1.81 MMHG
BH CV ECHO MEAS - LV MEAN PG: 0.8 MMHG
BH CV ECHO MEAS - LV V1 MAX: 66.2 CM/SEC
BH CV ECHO MEAS - LV V1 VTI: 10.2 CM
BH CV ECHO MEAS - LVIDD: 4.3 CM
BH CV ECHO MEAS - LVIDS: 3.6 CM
BH CV ECHO MEAS - LVOT AREA: 3.5 CM2
BH CV ECHO MEAS - LVOT DIAM: 2.1 CM
BH CV ECHO MEAS - LVPWD: 1.4 CM
BH CV ECHO MEAS - MED PEAK E' VEL: 6.7 CM/SEC
BH CV ECHO MEAS - MR MAX PG: 70.2 MMHG
BH CV ECHO MEAS - MR MAX VEL: 419 CM/SEC
BH CV ECHO MEAS - MV DEC SLOPE: 395 CM/SEC2
BH CV ECHO MEAS - MV MAX PG: 2.12 MMHG
BH CV ECHO MEAS - MV MEAN PG: 1 MMHG
BH CV ECHO MEAS - MV P1/2T: 36.4 MSEC
BH CV ECHO MEAS - MV V2 VTI: 11.8 CM
BH CV ECHO MEAS - MVA(P1/2T): 6 CM2
BH CV ECHO MEAS - MVA(VTI): 3 CM2
BH CV ECHO MEAS - PA ACC TIME: 0.12 SEC
BH CV ECHO MEAS - PA V2 MAX: 114 CM/SEC
BH CV ECHO MEAS - PI END-D VEL: 78.6 CM/SEC
BH CV ECHO MEAS - SV(LVOT): 35.4 ML
BH CV ECHO MEAS - SV(MOD-SP2): 28.8 ML
BH CV ECHO MEAS - SV(MOD-SP4): 33.3 ML
BH CV ECHO MEAS - TAPSE (>1.6): 1.28 CM
BH CV ECHO MEAS - TR MAX PG: 30 MMHG
BH CV ECHO MEAS - TR MAX VEL: 239.3 CM/SEC
BH CV XLRA - RV BASE: 3.7 CM
BH CV XLRA - RV LENGTH: 7.7 CM
BH CV XLRA - RV MID: 3.4 CM
BH CV XLRA - TDI S': 8.5 CM/SEC
BILIRUB SERPL-MCNC: 1 MG/DL (ref 0–1.2)
BILIRUB UR QL STRIP: NEGATIVE
BUN SERPL-MCNC: 14 MG/DL (ref 8–23)
BUN/CREAT SERPL: 8.9 (ref 7–25)
CALCIUM SPEC-SCNC: 9.3 MG/DL (ref 8.6–10.5)
CHLORIDE SERPL-SCNC: 105 MMOL/L (ref 98–107)
CHOLEST SERPL-MCNC: 131 MG/DL (ref 0–200)
CLARITY UR: CLEAR
CO2 SERPL-SCNC: 26 MMOL/L (ref 22–29)
COLOR UR: YELLOW
CREAT SERPL-MCNC: 1.58 MG/DL (ref 0.76–1.27)
DEPRECATED RDW RBC AUTO: 41.7 FL (ref 37–54)
EGFRCR SERPLBLD CKD-EPI 2021: 42.6 ML/MIN/1.73
EOSINOPHIL # BLD AUTO: 0.54 10*3/MM3 (ref 0–0.4)
EOSINOPHIL NFR BLD AUTO: 6.5 % (ref 0.3–6.2)
ERYTHROCYTE [DISTWIDTH] IN BLOOD BY AUTOMATED COUNT: 13.6 % (ref 12.3–15.4)
GLOBULIN UR ELPH-MCNC: 2.6 GM/DL
GLUCOSE SERPL-MCNC: 138 MG/DL (ref 65–99)
GLUCOSE UR STRIP-MCNC: ABNORMAL MG/DL
HBA1C MFR BLD: 6.7 % (ref 4.8–5.6)
HCT VFR BLD AUTO: 47.3 % (ref 37.5–51)
HDLC SERPL-MCNC: 57 MG/DL (ref 40–60)
HGB BLD-MCNC: 15.5 G/DL (ref 13–17.7)
HGB UR QL STRIP.AUTO: NEGATIVE
IMM GRANULOCYTES # BLD AUTO: 0.03 10*3/MM3 (ref 0–0.05)
IMM GRANULOCYTES NFR BLD AUTO: 0.4 % (ref 0–0.5)
KETONES UR QL STRIP: NEGATIVE
LDLC SERPL CALC-MCNC: 60 MG/DL (ref 0–100)
LDLC/HDLC SERPL: 1.06 {RATIO}
LEFT ATRIUM VOLUME INDEX: 36.1 ML/M2
LEUKOCYTE ESTERASE UR QL STRIP.AUTO: NEGATIVE
LV EF 2D ECHO EST: 45 %
LYMPHOCYTES # BLD AUTO: 1.21 10*3/MM3 (ref 0.7–3.1)
LYMPHOCYTES NFR BLD AUTO: 14.5 % (ref 19.6–45.3)
MCH RBC QN AUTO: 28 PG (ref 26.6–33)
MCHC RBC AUTO-ENTMCNC: 32.8 G/DL (ref 31.5–35.7)
MCV RBC AUTO: 85.4 FL (ref 79–97)
MONOCYTES # BLD AUTO: 0.69 10*3/MM3 (ref 0.1–0.9)
MONOCYTES NFR BLD AUTO: 8.2 % (ref 5–12)
NEUTROPHILS NFR BLD AUTO: 5.83 10*3/MM3 (ref 1.7–7)
NEUTROPHILS NFR BLD AUTO: 69.6 % (ref 42.7–76)
NITRITE UR QL STRIP: NEGATIVE
NRBC BLD AUTO-RTO: 0 /100 WBC (ref 0–0.2)
PH UR STRIP.AUTO: 5.5 [PH] (ref 5–8)
PLATELET # BLD AUTO: 219 10*3/MM3 (ref 140–450)
PMV BLD AUTO: 10.3 FL (ref 6–12)
POTASSIUM SERPL-SCNC: 4 MMOL/L (ref 3.5–5.2)
PROT SERPL-MCNC: 6.3 G/DL (ref 6–8.5)
PROT UR QL STRIP: ABNORMAL
RBC # BLD AUTO: 5.54 10*6/MM3 (ref 4.14–5.8)
SODIUM SERPL-SCNC: 142 MMOL/L (ref 136–145)
SP GR UR STRIP: 1.02 (ref 1–1.03)
TRIGL SERPL-MCNC: 68 MG/DL (ref 0–150)
UROBILINOGEN UR QL STRIP: ABNORMAL
VLDLC SERPL-MCNC: 14 MG/DL (ref 5–40)
WBC NRBC COR # BLD: 8.37 10*3/MM3 (ref 3.4–10.8)

## 2023-07-21 PROCEDURE — G0378 HOSPITAL OBSERVATION PER HR: HCPCS

## 2023-07-21 PROCEDURE — 25010000002 LORAZEPAM PER 2 MG: Performed by: INTERNAL MEDICINE

## 2023-07-21 PROCEDURE — 99214 OFFICE O/P EST MOD 30 MIN: CPT | Performed by: INTERNAL MEDICINE

## 2023-07-21 PROCEDURE — 93306 TTE W/DOPPLER COMPLETE: CPT

## 2023-07-21 PROCEDURE — 80061 LIPID PANEL: CPT | Performed by: NURSE PRACTITIONER

## 2023-07-21 PROCEDURE — 81003 URINALYSIS AUTO W/O SCOPE: CPT | Performed by: NURSE PRACTITIONER

## 2023-07-21 PROCEDURE — 93306 TTE W/DOPPLER COMPLETE: CPT | Performed by: INTERNAL MEDICINE

## 2023-07-21 PROCEDURE — 96376 TX/PRO/DX INJ SAME DRUG ADON: CPT

## 2023-07-21 PROCEDURE — 83036 HEMOGLOBIN GLYCOSYLATED A1C: CPT | Performed by: NURSE PRACTITIONER

## 2023-07-21 PROCEDURE — 80053 COMPREHEN METABOLIC PANEL: CPT | Performed by: NURSE PRACTITIONER

## 2023-07-21 PROCEDURE — 85025 COMPLETE CBC W/AUTO DIFF WBC: CPT | Performed by: NURSE PRACTITIONER

## 2023-07-21 RX ORDER — LORAZEPAM 2 MG/ML
0.25 INJECTION INTRAMUSCULAR EVERY 4 HOURS PRN
Status: DISCONTINUED | OUTPATIENT
Start: 2023-07-21 | End: 2023-07-21 | Stop reason: HOSPADM

## 2023-07-21 RX ORDER — DILTIAZEM HYDROCHLORIDE 120 MG/1
120 CAPSULE, COATED, EXTENDED RELEASE ORAL
Qty: 30 CAPSULE | Refills: 0 | Status: CANCELLED | OUTPATIENT
Start: 2023-07-21

## 2023-07-21 RX ORDER — HYDROXYZINE HYDROCHLORIDE 25 MG/1
25 TABLET, FILM COATED ORAL 3 TIMES DAILY PRN
Status: DISCONTINUED | OUTPATIENT
Start: 2023-07-21 | End: 2023-07-21 | Stop reason: HOSPADM

## 2023-07-21 RX ORDER — LORAZEPAM 2 MG/ML
0.25 INJECTION INTRAMUSCULAR ONCE
Status: COMPLETED | OUTPATIENT
Start: 2023-07-21 | End: 2023-07-21

## 2023-07-21 RX ORDER — DILTIAZEM HYDROCHLORIDE 120 MG/1
120 CAPSULE, COATED, EXTENDED RELEASE ORAL
Status: DISCONTINUED | OUTPATIENT
Start: 2023-07-21 | End: 2023-07-21 | Stop reason: HOSPADM

## 2023-07-21 RX ORDER — DILTIAZEM HYDROCHLORIDE 120 MG/1
120 CAPSULE, COATED, EXTENDED RELEASE ORAL
Qty: 30 CAPSULE | Refills: 0 | Status: SHIPPED | OUTPATIENT
Start: 2023-07-21

## 2023-07-21 RX ADMIN — LIDOCAINE 1 PATCH: 50 PATCH CUTANEOUS at 08:42

## 2023-07-21 RX ADMIN — Medication 10 ML: at 08:44

## 2023-07-21 RX ADMIN — EMPAGLIFLOZIN 10 MG: 10 TABLET, FILM COATED ORAL at 08:42

## 2023-07-21 RX ADMIN — HYDROXYZINE HYDROCHLORIDE 25 MG: 25 TABLET, FILM COATED ORAL at 11:46

## 2023-07-21 RX ADMIN — DILTIAZEM HYDROCHLORIDE 120 MG: 120 CAPSULE, COATED, EXTENDED RELEASE ORAL at 17:44

## 2023-07-21 RX ADMIN — PRAVASTATIN SODIUM 20 MG: 20 TABLET ORAL at 00:14

## 2023-07-21 RX ADMIN — TERAZOSIN HYDROCHLORIDE 10 MG: 5 CAPSULE ORAL at 00:14

## 2023-07-21 RX ADMIN — APIXABAN 2.5 MG: 2.5 TABLET, FILM COATED ORAL at 11:46

## 2023-07-21 RX ADMIN — SENNOSIDES AND DOCUSATE SODIUM 2 TABLET: 50; 8.6 TABLET ORAL at 00:14

## 2023-07-21 RX ADMIN — DILTIAZEM HYDROCHLORIDE 30 MG: 30 TABLET, FILM COATED ORAL at 11:46

## 2023-07-21 RX ADMIN — ASPIRIN 81 MG: 81 TABLET, COATED ORAL at 08:42

## 2023-07-21 RX ADMIN — LORAZEPAM 0.25 MG: 2 INJECTION INTRAMUSCULAR; INTRAVENOUS at 17:44

## 2023-07-21 RX ADMIN — ISOSORBIDE MONONITRATE 30 MG: 30 TABLET, EXTENDED RELEASE ORAL at 08:42

## 2023-07-21 RX ADMIN — SENNOSIDES AND DOCUSATE SODIUM 2 TABLET: 50; 8.6 TABLET ORAL at 08:49

## 2023-07-21 RX ADMIN — LORAZEPAM 0.25 MG: 2 INJECTION INTRAMUSCULAR; INTRAVENOUS at 01:56

## 2023-07-21 NOTE — PROGRESS NOTES
Patient is on Apixaban.  Education provided to patient and his daughter on 7/21/2023 verbally and in writing.  Information provided includes effects of medication, drug-drug and drug-food interactions, and signs/symptoms of bleeding and clotting.  Patient verbalized understanding through teach back.  All pertinent questions were answered.

## 2023-07-21 NOTE — PLAN OF CARE
Pt ordered restraints last night due to restlessness, high fall risk, and constantly trying to get out of bed. MD ordered x2 ativan 0.25mg IVP, helped him finally calm down and sleep. Restraints are not currently in use due to pt staying in bed.  2L NC at night, Afib, AO4, restless and generalized weakness

## 2023-07-21 NOTE — NURSING NOTE
New palliative consult. Met with pt, dtr Keithdena and 2 grandsons.  Hospice Liaison Maryuriquita Obrien also present.  Candy and pt state their goal is to go home and be as comfortable as possible. Dtr understands pt is in afib and understands risks associated with afib. Pt and dtr considering stopping all noncomfort meds due to poor quality of life.  Pt states he wants to stop lasix because it is making him miserable and he is voiding all night long.  Discussed with pt what will happen if he stops lasix. Pt and dtr verbalize understanding.  Plans to continue further conversation about stopping meds at home and with hospice and primary care MD, Dr Ashleigh Cornejo. Spoke with Dr Cronejo and Dr Jolly regarding pt's wishes to go home and plan for hospice follow up. Spoke with home hospice and they plan to follow up with pt and dtr tomorrow at home.   Dr Corbett reviewed the chart for hospice appropriateness.  Dr Jolly will d/c pt on cardizem and eliquis for afib and then pt can choose whether he wishes to continue with meds or not. Pt has hydroxyzine at home for anxiety. Pt given dose of ativan for anxiety now. Pt has some mild pain  left flank and LLE and denies dyspnea despite breathing fast.  Pt is currently on room air and heart rate in 110's but anxious.  Dtr states [facility]. Discussed use for pain and dyspnea.  Palliative referral to [facility] made in case pt doesn't get admitted to hospice tomorrow after evaluation.  After review of follow up with palliative and hospice, Dr Jolly agreeable to d/c pt home today. Pt's dtr and pt are very happy with decision to d/c home.  Pt will have dtr or caregiver staying with him.

## 2023-07-21 NOTE — CASE MANAGEMENT/SOCIAL WORK
Discharge Planning Assessment  [facility]     Patient Name: Elver Montenegro  MRN: [id number]  Today's Date: 7/21/2023    Admit Date: 7/20/2023    Plan: Home w/ HH   Discharge Needs Assessment       Row Name 07/21/23 1318       Living Environment    People in Home alone    Current Living Arrangements home    Potentially Unsafe Housing Conditions none    Primary Care Provided by self    Provides Primary Care For no one    Family Caregiver if Needed child(naomi), adult    Family Caregiver Names Asia Montenegro    Quality of Family Relationships involved;supportive    Able to Return to Prior Arrangements yes       Resource/Environmental Concerns    Resource/Environmental Concerns none       Transition Planning    Patient/Family Anticipates Transition to home    Patient/Family Anticipated Services at Transition home health care    Transportation Anticipated family or friend will provide       Discharge Needs Assessment    Readmission Within the Last 30 Days no previous admission in last 30 days    Equipment Currently Used at Home walker, rolling;cane, straight    Concerns to be Addressed denies needs/concerns at this time;discharge planning    Anticipated Changes Related to Illness none    Equipment Needed After Discharge none    Current Discharge Risk dependent with mobility/activities of daily living;chronically ill;lives alone    Discharge Coordination/Progress Home w/ HH                   Discharge Plan       Row Name 07/21/23 1320       Plan    Plan Home w/ HH    Patient/Family in Agreement with Plan yes    Plan Comments Per MDR, patient has been started on Eliquis.  Spoke with patient at daughter at bedside regarding discharge planning.  Patient c/o his hearing aids are not working and request CM speak with his daughter.  She reports that [facility] but it will take a while before it has been completed.  He recently began using a Roling Walker and a Straight Cane.  He has prescription  coverage and medications are affordable.  He lives alone in a single level house with one step to enter.  The house has a basement but he has not need to go downstairs.  Patient received the COVID vaccine.   CM advised can provide a 30 day free card for Eliquis.  No discharge needs at this time.  CM following.  Patient plan is to discharge home via car with family to transport.    Final Discharge Disposition Code 06 - home with home health care                  Continued Care and Services - Admitted Since 7/20/2023    Coordination has not been started for this encounter.       Expected Discharge Date and Time       Expected Discharge Date Expected Discharge Time    Jul 25, 2023            Demographic Summary       Row Name 07/21/23 1317       General Information    Admission Type observation    Arrived From home    Referral Source admission list    Reason for Consult discharge planning    Preferred Language English    General Information Comments Ashleigh Armstrong MD       Contact Information    Permission Granted to Share Info With     Contact Information Comments Asia Montenegro, daughter  [phone]  Jaden Paul grandchild  [phone]                   Functional Status       Row Name 07/21/23 1318       Functional Status    Usual Activity Tolerance good    Current Activity Tolerance moderate       Functional Status, IADL    Medications independent    Meal Preparation assistive equipment    Housekeeping assistive equipment    Laundry assistive equipment    Shopping assistive equipment       Employment/    Employment/ Comments [employer] Replacement                   Psychosocial    No documentation.                  Abuse/Neglect    No documentation.                  Legal    No documentation.                  Substance Abuse    No documentation.                  Patient Forms    No documentation.                     Katharine Bocanegra RN

## 2023-07-21 NOTE — CONSULTS
"[facility] Cardiology  Initial Consult Note      Patient Identification:  Elver Montenegro        83 y.o.        male  1940  [id number]       Date of Consultation:  07/21/23    Reason for Consultation:  atrial fibrillation     PCP: Ashleigh Cornejo MD  Primary cardiologist: Chantelle Silverio MD  Referring physician: Nubia Jolly DO    History of Present Illness:     Elver Montenegro is a 83 y.o. with a history of nonobstructive coronary artery disease, LVH with impaired diastolic function, hypertension, BPH, hyperlipidemia, chronic kidney disease who presented to the emergency department yesterday with increased flank pain and had been found to have new diagnosis atrial [facility]  He denies having any recent chest pain, worsening leg swelling, PND, or orthopnea.  Overnight, he was very agitated due to pain, and was given as needed benzodiazepines.    This morning, he remains in atrial fibrillation with RVR, and had not received any rate control medications overnight.  With heart rates varying between the 90s and 130s on exam he denies any palpitations or chest discomfort.  I spoke with his daughter, Asia, reports that he had been on beta-blockers and nifedipine in the past with both of these medicines he \"did not do well\" and seem to have more confusion and fatigue.    Past History:  Past Medical History:   Diagnosis Date    Cataract     Left eye    Hypertension     Stroke      Past Surgical History:   Procedure Laterality Date    CARDIAC CATHETERIZATION Left 12/11/2019    Procedure: Left Heart Cath;  Surgeon: Chantelle Silverio MD;  Location: [facility] CATH INVASIVE LOCATION;  Service: Cardiology    CHOLECYSTECTOMY      JOINT REPLACEMENT Bilateral     KNEES     Allergies   Allergen Reactions    Clonidine Hcl Shortness Of Breath    Atenolol Itching and Unknown (See Comments)     Patient unclear of reaction    Clonidine Derivatives     Irbesartan Swelling "    Lisinopril Other (See Comments)     UNKNOWN    Nifedipine     Tetanus Toxoid GI Intolerance    Tetanus Toxoids      Social History     Socioeconomic History    Marital status:    Tobacco Use    Smoking status: Never     Passive exposure: Never    Smokeless tobacco: Never   Vaping Use    Vaping Use: Never used   Substance and Sexual Activity    Alcohol use: No    Drug use: No    Sexual activity: Defer     Family History   Problem Relation Age of Onset    Heart disease Mother     Cancer Mother     Aneurysm Father      Medications:  Medications Prior to Admission   Medication Sig Dispense Refill Last Dose    aspirin 81 MG tablet Take 1 tablet by mouth Daily.   7/20/2023    diphenhydrAMINE HCl (BENADRYL ALLERGY PO) Take 25 mg by mouth 2 (Two) Times a Day.   7/20/2023    doxazosin (CARDURA) 8 MG tablet TAKE 1 TABLET BY MOUTH EVERY NIGHT. 90 tablet 0 7/19/2023    empagliflozin (Jardiance) 10 MG tablet tablet Take 1 tablet by mouth Daily. 90 tablet 3 7/20/2023    furosemide (LASIX) 20 MG tablet Take 1 tablet by mouth Daily. TAKE ONE TABLET DAILY AS NEEDED FOR FLUID RETENTION 90 tablet 0 7/20/2023    isosorbide mononitrate (IMDUR) 30 MG 24 hr tablet TAKE 1 TABLET EVERY DAY 90 tablet 3 7/20/2023    pravastatin (PRAVACHOL) 20 MG tablet TAKE 1 TABLET EVERY DAY 30 tablet 0 7/20/2023    potassium chloride (MICRO-K) 10 MEQ CR capsule TAKE 1 CAPSULE EVERY DAY (WITH LASIX) AS NEEDED 90 capsule 0 Unknown     Current medications:  aspirin, 81 mg, Oral, Daily  empagliflozin, 10 mg, Oral, Daily  isosorbide mononitrate, 30 mg, Oral, Daily  lidocaine, 1 patch, Transdermal, Q24H  pravastatin, 20 mg, Oral, Nightly  senna-docusate sodium, 2 tablet, Oral, BID  sodium chloride, 10 mL, Intravenous, Q12H  terazosin, 10 mg, Oral, Nightly      Current IV drips:       Review of Systems   Cardiovascular:  Positive for dyspnea on exertion. Negative for chest pain, irregular heartbeat, leg swelling, palpitations and paroxysmal nocturnal  dyspnea.   Musculoskeletal:  Positive for arthritis and back pain.     Physical exam:  Temp:  [97.6 °F (36.4 °C)-98.2 °F (36.8 °C)] 97.7 °F (36.5 °C)  Heart Rate:  [] 124  Resp:  [20-26] 22  BP: (113-138)/() 138/85  Body mass index is 27.75 kg/m².    Gen: well developed, lying in bed, comfortable appearing  HEENT: MMM, sclerae anicteric, conjunctivae normal  CV: r tachycardia, irregularly irregular, distant heart sounds with no appreciable murmurs, normal S1, S2. 2+ radial and DP pulses  Pulm: RA, normal work of breathing, no wheezes, rales, rhonchi  Abd: soft, nontender, nondistended  Ext: normal bulk for age, normal tone, no dependent edema  Neuro: alert, oriented, face symmetrical, moving all extremities well  Psych: normal mood, appropriate affect    Cardiac Testing:    ECG  (personally reviewed) atrial fibrillation with RVR    Telemetry:  (personally reviewed) Afib with RVR, rates 90s-130s    ECHO:   Results for orders placed during the hospital encounter of 11/06/22    Adult Transthoracic Echo Complete W/ Cont if Necessary Per Protocol    Interpretation Summary    Left ventricular systolic function is normal. Estimated left ventricular EF = 55%    Left ventricular wall thickness is consistent with mild concentric hypertrophy.    Left atrial volume is moderately increased.    Mild to moderate mitral regurgitation.    Mild tricuspid regurgitation with normal RVSP.    Catheterization:    12/21/2019 -left heart cath, Dr. Silverio  FINAL IMPRESSION:  Diffuse, nonfocal disease involving multiple small vessels as well as the small caliber LAD.  Normal LV systolic function, estimated EF 60%.     RECOMMENDATIONS:  Due to diffuse nature of disease involving small vessels the anatomy is not suitable for catheter-based or surgical revascularization and optimal guideline based medical therapy/risk factor management is recommended.    Considering normal LV function the patient has fair prognosis on medical  therapy.    Stress Testing:    10/23/2019 stress test with PET MPI  The patient denied chest pain.  There was no EKG evidence of ischemia, there were occasional PVCs.  Myocardial perfusion imaging shows a small size, mild intensity area of inferoapical ischemia.  No TID  REST EF = 59% STRESS EF = 58% Wall motion is normal  CT portion of the exam shows moderate amount of coronary artery calcification, predominantly in the LAD distribution  Abnormal myocardial perfusion study with small amount of mild inferoapical ischemia. The study is low risk due to the small amount of ischemia detected    Lab Review:    Lab Results   Component Value Date    WBC 8.37 07/21/2023    HGB 15.5 07/21/2023    HCT 47.3 07/21/2023    MCV 85.4 07/21/2023     07/21/2023     Lab Results   Component Value Date    GLUCOSE 138 (H) 07/21/2023    BUN 14 07/21/2023    CREATININE 1.58 (H) 07/21/2023    EGFRIFNONA 43 (L) 12/11/2019    BCR 8.9 07/21/2023    K 4.0 07/21/2023    CO2 26.0 07/21/2023    CALCIUM 9.3 07/21/2023    ALBUMIN 3.7 07/21/2023    AST 11 07/21/2023    ALT 10 07/21/2023     Lab Results   Component Value Date    TROPONINT 44 (H) 07/20/2023    TROPONINT 59 (C) 07/20/2023    TROPONINT 51 (H) 06/01/2023     Lab Results   Component Value Date    PROBNP 2,650.0 (H) 07/20/2023     Lab Results   Component Value Date    HGBA1C 6.70 (H) 07/21/2023      Lab Results   Component Value Date    CHOL 131 07/21/2023    TRIG 68 07/21/2023    HDL 57 07/21/2023    LDL 60 07/21/2023     Imaging:  All pertinent imaging studies were personally reviewed.    Assessment & Plan    CHF (congestive heart failure)    Hypertension    BPH (benign prostatic hyperplasia)    Grade II diastolic dysfunction    Non-occlusive coronary artery disease    Dyslipidemia    Atrial fibrillation with RVR    New diagnosis atrial fibrillation  Afib with RVR    - will start diltiazem 30mg q6 hours and titrate as needed   - apixaban 2.5mg BID with advanced age and renal  function  [id number] Score: 7 (7/21/2023  9:35 AM)    Chronic heart failure with preserved ejection fraction   - Appears euvolemic and well compensated currently, oxygenating well on room air, no x-ray evidence of significant pulmonary edema   - Echocardiogram ordered    Mildly elevated high-sensitivity troponin   - Secondary to chronic heart failure and chronic kidney disease, presentation not concerning for ACS    Thank you for allowing us to share in the care of Elver Morales MD  07/21/23   09:21 EDT

## 2023-07-21 NOTE — H&P
[facility] Medicine Services  HISTORY AND PHYSICAL    Patient Name: Elver Montenegro  : 1940  MRN: [id number]  Primary Care Physician: Ashleigh Cornejo MD  Date of admission: 2023    Subjective   Subjective     Chief Complaint  Left flank pain, shortness of air, irregular heart beat     HPI:  Elver Montenegro is a 83 y.o. male with a past medical history significant for BPH, CVA, CAD, essential hypertension, HFpEF and dyslipidemia presents to the ED due to increased shortness of air, left flank pain and irregular heart beat.  Patients daughter is at bedside who provides most of HPI.  She reports two days ago noticing her father having increased shortness of air.  Additionally the patient reports two days ago he developed left flank pain after rolling over in bed.  Since then he describes intermittent pain to his left flank with bearing weight. Daughter states that the patient was at her home two days ago and fell landing on his bottom but denies any head injury.  [facility]  [facility]ain films were obtained and he was told that his pain was arthritic in nature.  Daughter states that the provided decided to do an EKG in office and patient was noted to be in atrial fibrillation RVR which is new.  He was advised to go to the ED for further evaluation.  Daughter denies any known fever, chills, nausea, vomiting diarrhea, cough, chest pain, or abdominal pain.  Patient does report some shortness of air when lying flat.  Patient was taken off his lasix in April of this year. On arrival EKG was obtained that showed atrial fibrillation RVR with .  While at bedside patient is having urinary frequency.  He reports some dysuria.  Patient will be [facility] to [facility] under the care of the Hospitalist for further evaluation and treatment.         Review of Systems   Constitutional:  Positive for activity change and fatigue. Negative for appetite change,  chills, diaphoresis, fever and unexpected weight change.   HENT: Negative.     Eyes:  Negative for photophobia and visual disturbance.   Respiratory:  Positive for shortness of breath. Negative for cough.    Cardiovascular:  Negative for chest pain, palpitations and leg swelling.   Gastrointestinal:  Negative for abdominal distention, abdominal pain, blood in stool, constipation, diarrhea, nausea and vomiting.   Genitourinary:  Positive for dysuria, flank pain and frequency. Negative for difficulty urinating and hematuria.   Musculoskeletal:  Negative for back pain, neck pain and neck stiffness.   Skin: Negative.    Neurological:  Positive for weakness. Negative for dizziness, facial asymmetry, speech difficulty, light-headedness, numbness and headaches.   Psychiatric/Behavioral: Negative.            Personal History     Past Medical History:   Diagnosis Date    Cataract     Left eye    Hypertension     Stroke              Past Surgical History:   Procedure Laterality Date    CARDIAC CATHETERIZATION Left 12/11/2019    Procedure: Left Heart Cath;  Surgeon: Chantelle Silverio MD;  Location: [facility] CATH INVASIVE LOCATION;  Service: Cardiology    CHOLECYSTECTOMY      JOINT REPLACEMENT Bilateral     KNEES       Family History:  family history includes Aneurysm in his father; Cancer in his mother; Heart disease in his mother.     Social History:  reports that he has never smoked. He has never been exposed to tobacco smoke. He has never used smokeless tobacco. He reports that he does not drink alcohol and does not use drugs.  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       Medications:  aspirin, diphenhydrAMINE HCl, doxazosin, empagliflozin, furosemide, isosorbide mononitrate, potassium chloride, and pravastatin    Allergies   Allergen Reactions    Clonidine Hcl Shortness Of Breath    Atenolol Itching and Unknown (See Comments)     Patient unclear of reaction    Clonidine  Derivatives     Irbesartan Swelling    Lisinopril Other (See Comments)     UNKNOWN    Nifedipine     Tetanus Toxoid GI Intolerance    Tetanus Toxoids        Objective   Objective     Vital Signs:   Temp:  [97.6 °F (36.4 °C)-98.2 °F (36.8 °C)] 97.6 °F (36.4 °C)  Heart Rate:  [] 124  Resp:  [20-26] 26  BP: (113-135)/(81-94) 118/81    Physical Exam  Vitals and nursing note reviewed.   Constitutional:       General: He is not in acute distress.     Appearance: Normal appearance. He is not ill-appearing, toxic-appearing or diaphoretic.   HENT:      Head: Normocephalic and atraumatic.      Nose: Nose normal.      Mouth/Throat:      Mouth: Mucous membranes are dry.      Pharynx: Oropharynx is clear.   Eyes:      Extraocular Movements: Extraocular movements intact.      Pupils: Pupils are equal, round, and reactive to light.   Cardiovascular:      Rate and Rhythm: Tachycardia present. Rhythm irregular.      Pulses: Normal pulses.      Heart sounds: Normal heart sounds.   Pulmonary:      Effort: Pulmonary effort is normal.      Breath sounds: Normal breath sounds.   Abdominal:      General: Bowel sounds are normal. There is no distension.      Palpations: Abdomen is soft. There is no mass.      Tenderness: There is no abdominal tenderness. There is no right CVA tenderness, left CVA tenderness, guarding or rebound.      Hernia: No hernia is present.   Musculoskeletal:         General: Tenderness present. No swelling, deformity or signs of injury. Normal range of motion.      Cervical back: Normal range of motion and neck supple.      Right lower leg: No edema.      Left lower leg: No edema.      Comments: Tenderness to left flank/lower back    Skin:     General: Skin is warm and dry.   Neurological:      General: No focal deficit present.      Mental Status: He is alert and oriented to person, place, and time. Mental status is at baseline.   Psychiatric:         Mood and Affect: Mood normal.         Behavior: Behavior  normal.         Thought Content: Thought content normal.         Judgment: Judgment normal.          Result Review:  I have personally reviewed the results from the time of this admission to 7/20/2023 23:11 EDT and agree with these findings:  [x]  Laboratory list / accordion  [x]  Microbiology  [x]  Radiology  [x]  EKG/Telemetry   []  Cardiology/Vascular   []  Pathology  [x]  Old records  []  Other:  Most notable findings include:     LAB RESULTS:      Lab 07/20/23  1659   WBC 7.88   HEMOGLOBIN 15.3   HEMATOCRIT 47.1   PLATELETS 224   NEUTROS ABS 5.61   IMMATURE GRANS (ABS) 0.03   LYMPHS ABS 1.17   MONOS ABS 0.62   EOS ABS 0.40   MCV 86.4         Lab 07/20/23  1659   SODIUM 142   POTASSIUM 4.0   CHLORIDE 103   CO2 26.0   ANION GAP 13.0   BUN 13   CREATININE 1.58*   EGFR 42.6*   GLUCOSE 132*   CALCIUM 9.2   MAGNESIUM 2.1   TSH 2.400         Lab 07/20/23  1659   TOTAL PROTEIN 6.4   ALBUMIN 3.8   GLOBULIN 2.6   ALT (SGPT) 11   AST (SGOT) 12   BILIRUBIN 0.8   ALK PHOS 88         Lab 07/20/23  1927 07/20/23  1659   PROBNP  --  2,650.0*   HSTROP T 44* 59*                 Brief Urine Lab Results       None          Microbiology Results (last 10 days)       ** No results found for the last 240 hours. **            CT Pelvis Without Contrast    Result Date: 7/20/2023  CT PELVIS WO CONTRAST Date of Exam: 7/20/2023 5:35 PM EDT Indication: left hip pain. Comparison: None available. Technique: Axial CT images were obtained of the pelvis without contrast administration.  Reconstructed coronal and sagittal images were also obtained. Automated exposure control and iterative construction methods were used. Findings: No acute fracture or traumatic malalignment. Enlarged prostate measuring 5.5 x 5.6 cm in transverse dimension. There is a large fat-containing left inguinal hernia which extends into the left scrotum. Incidental note of left pelvic kidney with exophytic simple cyst. There is severe degenerative disc disease in the  partially imaged lower lumbar spine..     Impression: Impression: No acute fracture or traumatic malalignment. Electronically Signed: Lenny Pal  7/20/2023 6:02 PM EDT  Workstation ID: [id number]    XR Chest 1 View    Result Date: 7/20/2023  XR CHEST 1 VW Date of Exam: 7/20/2023 5:06 PM EDT Indication: Dysrhythmia triage protocol Comparison: Chest x-ray 6/1/2023 Findings: Stable cardiomediastinal silhouette with top normal size of the heart. Mild diffuse interstitial prominence is unchanged, likely chronic/senescent interstitial change. No definite pulmonary edema. Minimal streaky opacities at the left lung base likely reflect some atelectasis. No definite pleural effusion noting that the bilateral costophrenic angles are partially excluded from the field-of-view. No evidence of pneumothorax. No acute osseous findings. Severe osteoarthritic changes are noted at the bilateral glenohumeral joints.     Impression: Impression: Suspected mild streaky atelectasis at the left base. No definite acute cardiopulmonary findings. Electronically Signed: Lenny Pal  7/20/2023 5:25 PM EDT  Workstation ID: [id number]     Results for orders placed during the hospital encounter of 11/06/22    Adult Transthoracic Echo Complete W/ Cont if Necessary Per Protocol    Interpretation Summary    Left ventricular systolic function is normal. Estimated left ventricular EF = 55%    Left ventricular wall thickness is consistent with mild concentric hypertrophy.    Left atrial volume is moderately increased.    Mild to moderate mitral regurgitation.    Mild tricuspid regurgitation with normal RVSP.      Assessment & Plan   Assessment & Plan       CHF (congestive heart failure)    Hypertension    BPH (benign prostatic hyperplasia)    Grade II diastolic dysfunction    Non-occlusive coronary artery disease    Dyslipidemia    Atrial fibrillation with RVR      83 year old male presents to the ED accompanied by daughter due to irregular heart beat,  left flank pain and shortness of air that began two days ago.     1) Atrial fibrillation with RVR, new onset   -[id number] score: 6  -hold on anticoagulation for now: daughter concerned risk vs benefit.    -  -EKG as noted above  -obtain echo in am   -per daughter patient unable to take beta blockers   -consult cardiology in am: request new cardiologist   -CXR negative   -check TSH   -check mag  -telemetry monitoring     2) Acute HFpEF   -ProBNP 2650.0  -CXR unremarkable   -s/p bumex 2 mg in the ED with good urinary output   -repeat bumex in am   -consult cardiology   -strict I &O   -daily weight   -last echo EF 55% mild to moderate MVR, mild TVR, RVSP normal   -continue Jardiance     3) Elevated troponin   -likely type type II due to #2 and CKD   -trending down  -EKG with no acute ST changes   -repeat EKG in am   -check FLP, hgb A1c     4) Left flank pain   -intermittent   -consider CT of lumbar spine if no improvement   -able to ambulate while at bedside  -lidoderm patch     5) CKD stage III   -baseline creatinine 1.5-1.6  -hold nephrotoxic agents   -monitor     6) Essential hypertension   -on imdur   -BP stable     7) Hyperlipidemia  -continue statin     8) CAD  -follows with Dr. Silverio  -last [facility] 12/2019  -continue ASA, imdur, Statin     9) BPH  -bladder scan Q6    10) Anxiety  -daughter at bedside requesting ativan   -will give one time dose of 0.25 ativan             DVT prophylaxis: scds    CODE STATUS:  DNR/DNI        Expected Discharge  TBD       This note has been completed as part of a split-shared workflow.     Signature: Electronically signed by LILY Payne, 07/20/23, 11:32 PM EDT.    Total time spent: 70 mins  Time spent includes time reviewing chart, face-to-face time, counseling patient/family/caregiver, ordering medications/tests/procedures, communicating with other health care professionals, documenting clinical information in the electronic health record, and coordination of  care.        Attending   Admission Attestation       I have performed an independent face-to-face diagnostic evaluation including performing an independent physical examination as documented here.  The documented plan of care above was reviewed and developed with the advanced practice clinician (APC).      Brief Summary Statement:   Elver Montenegro is a 83 y.o. male who I am seeing in the early hours of Friday morning 7/21.  The patient has received some Ativan, is sleeping comfortably during my visit, and cannot contribute to the HPI in a reliable fashion.  His daughter is at bedside who provides all information.  She states that 2 days ago the patient had some increased shortness of breath as well as left flank pain.  [facility]  EKG was obtained, and the patient was found to be in atrial fibrillation with RVR; this was reportedly a new diagnosis for him.  He was told to come to the ED here, and initial EKG showed A-fib with RVR with heart rate in the 130s.  The daughter states that the patient does not mention any C/O chest pain, shortness of breath, fever/chills, nausea/emesis, abdominal pain, bowel habit change, or syncope.  During my visit the patient has heart rate between  on his room monitor, and rate of 96, though irregularly irregular, on my exam (see below).    Remainder of detailed HPI is as noted by APC and has been reviewed and/or edited by me for completeness.    Attending Physical Exam:  Temp:  [97.6 °F (36.4 °C)-98.2 °F (36.8 °C)] 97.7 °F (36.5 °C)  Heart Rate:  [] 107  Resp:  [20-26] 22  BP: (113-135)/(81-94) 115/82    Constitutional: Asleep after receiving Ativan earlier, opens eyes briefly to verbal or tactile stimulation but falls back asleep quickly, does not interact at this time.  Eyes: PERRLA, sclerae anicteric, no conjunctival injection  HENT: NCAT, mucous membranes moist  Neck: Supple, no thyromegaly, no lymphadenopathy, trachea  midline  Respiratory: Clear to auscultation bilaterally, nonlabored respirations   Cardiovascular: Irregularly irregular but rate controlled, no murmurs, rubs, or gallops, palpable pedal pulses bilaterally  Gastrointestinal: Positive bowel sounds, soft, nontender, nondistended  Musculoskeletal: No bilateral ankle edema, no clubbing or cyanosis to extremities  Psychiatric: Asleep/somnolent; cannot fully assess.  Neurologic: Does not interact for strength or cranial nerve testing.  Pupils normal, JODIE.  Skin: No rashes, normal turgor.    Brief Assessment/Plan :  See detailed assessment and plan developed with APC which I have reviewed and/or edited for completeness.    Total time spent: 20 minutes  Time spent includes time reviewing chart, face-to-face time, counseling patient/family/caregiver, ordering medications/tests/procedures, communicating with other health care professionals, documenting clinical information in the electronic health record, and coordination of care.         Gael Bartlett III, DO  07/21/23

## 2023-07-21 NOTE — PROGRESS NOTES
[facility] Medicine Services  PROGRESS NOTE    Patient Name: Elver Montenegro  : 1940  MRN: [id number]    Date of Admission: 2023  Primary Care Physician: Ashleigh Cornejo MD    Subjective   Subjective     CC:  F/u A.fib RVR    HPI:  Patient resting in bed. He has no complaints other than needing to blow his nose    ROS:  Gen- No fevers, chills  CV- No chest pain, palpitations  Resp- No cough, dyspnea  GI- No N/V/D, abd pain    Objective   Objective     Vital Signs:   Temp:  [97.6 °F (36.4 °C)-98.2 °F (36.8 °C)] 97.7 °F (36.5 °C)  Heart Rate:  [] 136  Resp:  [20-26] 22  BP: (102-138)/() 102/87     Physical Exam:  Constitutional: No acute distress, awake, alert  HENT: NCAT, mucous membranes moist  Respiratory: Clear to auscultation bilaterally, respiratory effort normal   Cardiovascular: tachycardic, irregular, no murmurs, rubs, or gallops  Gastrointestinal: Positive bowel sounds, soft, nontender, nondistended  Musculoskeletal: No bilateral ankle edema  Psychiatric: Appropriate affect, cooperative  Neurologic: Oriented to person/place, no focal deficits  Skin: No rashes      Results Reviewed:  LAB RESULTS:      Lab 23  0548 23  1659   WBC 8.37 7.88   HEMOGLOBIN 15.5 15.3   HEMATOCRIT 47.3 47.1   PLATELETS 219 224   NEUTROS ABS 5.83 5.61   IMMATURE GRANS (ABS) 0.03 0.03   LYMPHS ABS 1.21 1.17   MONOS ABS 0.69 0.62   EOS ABS 0.54* 0.40   MCV 85.4 86.4         Lab 23  0548 23  1659   SODIUM 142 142   POTASSIUM 4.0 4.0   CHLORIDE 105 103   CO2 26.0 26.0   ANION GAP 11.0 13.0   BUN 14 13   CREATININE 1.58* 1.58*   EGFR 42.6* 42.6*   GLUCOSE 138* 132*   CALCIUM 9.3 9.2   MAGNESIUM  --  2.1   HEMOGLOBIN A1C 6.70*  --    TSH  --  2.400         Lab 23  0548 23  1659   TOTAL PROTEIN 6.3 6.4   ALBUMIN 3.7 3.8   GLOBULIN 2.6 2.6   ALT (SGPT) 10 11   AST (SGOT) 11 12   BILIRUBIN 1.0 0.8   ALK PHOS 88 88         Lab 23  192  07/20/23  1659   PROBNP  --  2,650.0*   HSTROP T 44* 59*         Lab 07/21/23  0548   CHOLESTEROL 131   LDL CHOL 60   HDL CHOL 57   TRIGLYCERIDES 68             Brief Urine Lab Results  (Last result in the past 365 days)        Color   Clarity   Blood   Leuk Est   Nitrite   Protein   CREAT   Urine HCG        07/21/23 1018 Yellow   Clear   Negative   Negative   Negative   Trace                   Microbiology Results Abnormal       None            CT Pelvis Without Contrast    Result Date: 7/20/2023  CT PELVIS WO CONTRAST Date of Exam: 7/20/2023 5:35 PM EDT Indication: left hip pain. Comparison: None available. Technique: Axial CT images were obtained of the pelvis without contrast administration.  Reconstructed coronal and sagittal images were also obtained. Automated exposure control and iterative construction methods were used. Findings: No acute fracture or traumatic malalignment. Enlarged prostate measuring 5.5 x 5.6 cm in transverse dimension. There is a large fat-containing left inguinal hernia which extends into the left scrotum. Incidental note of left pelvic kidney with exophytic simple cyst. There is severe degenerative disc disease in the partially imaged lower lumbar spine..     Impression: Impression: No acute fracture or traumatic malalignment. Electronically Signed: Lenny Pal  7/20/2023 6:02 PM EDT  Workstation ID: [id number]    XR Chest 1 View    Result Date: 7/20/2023  XR CHEST 1 VW Date of Exam: 7/20/2023 5:06 PM EDT Indication: Dysrhythmia triage protocol Comparison: Chest x-ray 6/1/2023 Findings: Stable cardiomediastinal silhouette with top normal size of the heart. Mild diffuse interstitial prominence is unchanged, likely chronic/senescent interstitial change. No definite pulmonary edema. Minimal streaky opacities at the left lung base likely reflect some atelectasis. No definite pleural effusion noting that the bilateral costophrenic angles are partially excluded from the field-of-view. No  evidence of pneumothorax. No acute osseous findings. Severe osteoarthritic changes are noted at the bilateral glenohumeral joints.     Impression: Impression: Suspected mild streaky atelectasis at the left base. No definite acute cardiopulmonary findings. Electronically Signed: Lenny Pal  7/20/2023 5:25 PM EDT  Workstation ID: [id number]     Results for orders placed during the hospital encounter of 11/06/22    Adult Transthoracic Echo Complete W/ Cont if Necessary Per Protocol    Interpretation Summary    Left ventricular systolic function is normal. Estimated left ventricular EF = 55%    Left ventricular wall thickness is consistent with mild concentric hypertrophy.    Left atrial volume is moderately increased.    Mild to moderate mitral regurgitation.    Mild tricuspid regurgitation with normal RVSP.      Current medications:  Scheduled Meds:apixaban, 2.5 mg, Oral, Q12H  dilTIAZem, 30 mg, Oral, Q6H  empagliflozin, 10 mg, Oral, Daily  isosorbide mononitrate, 30 mg, Oral, Daily  lidocaine, 1 patch, Transdermal, Q24H  pravastatin, 20 mg, Oral, Nightly  senna-docusate sodium, 2 tablet, Oral, BID  sodium chloride, 10 mL, Intravenous, Q12H  terazosin, 10 mg, Oral, Nightly      Continuous Infusions:   PRN Meds:.  acetaminophen **OR** acetaminophen **OR** acetaminophen    senna-docusate sodium **AND** polyethylene glycol **AND** bisacodyl **AND** bisacodyl    hydrOXYzine    nitroglycerin    sodium chloride    sodium chloride    sodium chloride    Assessment & Plan   Assessment & Plan     Active Hospital Problems    Diagnosis  POA    **CHF (congestive heart failure) [I50.9]  Yes    Atrial fibrillation with RVR [I48.91]  Yes    Dyslipidemia [E78.5]  Yes    Non-occlusive coronary artery disease [I25.10]  Yes    BPH (benign prostatic hyperplasia) [N40.0]  Yes    Grade II diastolic dysfunction [I51.89]  Yes    Hypertension [I10]  Yes      Resolved Hospital Problems   No resolved problems to display.        Brief Hospital  "Course to date:  Elver Montenegro is a 83 y.o. male 83 year old male presents to the ED accompanied by daughter due to irregular heart beat, left flank pain and shortness of air that began two days ago.      Atrial fibrillation with RVR, new onset   -UAH6LM8-HCIi score: 6, started on Eliquis  -Echo pending  -Cardiology consulted, appreciate input- started on diltiazem     Acute HFpEF   -ProBNP 2650.0  -CXR unremarkable   -s/p bumex 2 mg in the ED with good urinary output, appears compensated  -strict I &O, daily weight   -last echo EF 55% mild to moderate MVR, mild TVR, RVSP normal   -continue Jardiance      Elevated troponin   -likely type type II due to #2 and CKD   -trending down    Left flank pain   -intermittent   -lidoderm patch      CKD stage III   -baseline creatinine 1.5-1.6  -hold nephrotoxic agents   -monitor      Essential hypertension   -on imdur   -BP stable      Hyperlipidemia  -continue statin      CAD  -last [facility] 12/2019  -continue ASA, imdur, Statin      BPH  -bladder scan Q6     Anxiety  -start hydroxyzine 25mg TID PRN    *I had a long discussion with [facility]  [facility] Ashleigh Cornejo, daughter quite upset/stressed about current situation as she feels that her father is ready to stop all medical treatment and \"go home and die\". I did tell her that I did not feel that patient was actively dying at this time and I did not know if he would qualify for hospice care. PCP did verbalize that patient clearly stated to her that he was okay with palliative measures and hospice care and did not desire to have any further hospital admissions or treatment for his heart going forward, however this has not been established yet outpatient. Daughter agreeable to letting patient stay for palliative/hospice consultation with goals to get him home ASAP. I have placed consults.     Expected Discharge Location and Transportation: Home?  Expected Discharge   Expected Discharge Date: 7/25/2023; " Expected Discharge Time:      DVT prophylaxis:  Medical and mechanical DVT prophylaxis orders are present.          CODE STATUS:   Code Status and Medical Interventions:   Ordered at: 07/20/23 5099     Medical Intervention Limits:    NO intubation (DNI)     Level Of Support Discussed With:    Patient     Code Status (Patient has no pulse and is not breathing):    No CPR (Do Not Attempt to Resuscitate)     Medical Interventions (Patient has pulse or is breathing):    Limited Support     Release to patient:    Routine Release       Nubia Jolly,   07/21/23

## 2023-07-21 NOTE — NURSING NOTE
Pt arrived to unit around 1945. No admitting doctors or consults were listed.   Leyla (2100) the on-call hospitalist (Shanel Mitchell) who said they were not consulted.     Next leyla (21:11) cardiology (Tim Molina) who's team mentioned to be consulted in the ED note, they said they were not consulted.     Leyla hospitalist (21:19) again, told to page admitting on-call (Dr. Lara).     Dr. Lara (call back 21:47) was not informed of patient arrival nor admission to hospital until moments before phone call back and the patient has not been admitted nor orders placed. Pt family is upset and wanting to leave AMA due to no doctor visits, no diet orders, no medication orders.  stated pt would be put on list and unknown timeframe to be admitted.

## 2023-07-21 NOTE — DISCHARGE SUMMARY
[facility] Medicine Services  DISCHARGE SUMMARY    Patient Name: Elver Montenegro  : 1940  MRN: [id number]    Date of Admission: 2023  4:34 PM  Date of Discharge:  2023  Primary Care Physician: Ashleigh Cornejo MD    Consults       Date and Time Order Name Status Description    2023 12:34 AM Inpatient Cardiology Consult Completed             Hospital Course     Presenting Problem: Tachycardia    Active Hospital Problems    Diagnosis  POA    **CHF (congestive heart failure) [I50.9]  Yes    Atrial fibrillation with RVR [I48.91]  Yes    Dyslipidemia [E78.5]  Yes    Non-occlusive coronary artery disease [I25.10]  Yes    BPH (benign prostatic hyperplasia) [N40.0]  Yes    Grade II diastolic dysfunction [I51.89]  Yes    Hypertension [I10]  Yes      Resolved Hospital Problems   No resolved problems to display.          Hospital Course:  Elver Montenegro is a 83 y.o. male old male presents to the ED accompanied by daughter due to irregular heart beat, left flank pain and shortness of air that began two days ago.     Atrial fibrillation with RVR, new onset   -IEB6HI2-EIOe score: 6, started on Eliquis, however patient and daughter refused this medication at discharge due to palliative goals only  -Cardiology consulted, appreciate input, continue diltiazem if patient agreeable to taking this, prescription send to pharmacy     Acute HFpEF - resolved  -s/p bumex 2 mg in the ED with good urinary output, appears compensated and euvolemic  -previous Echo with EF 55% mild to moderate MVR, mild TVR, RVSP normal   -continue Jardiance      Elevated troponin   -likely type type II due to #2 and CKD   -trending down     Left flank pain   -intermittent   -lidoderm patch      CKD stage III   -baseline creatinine 1.5-1.6     Essential hypertension   -on imdur   -BP stable      Hyperlipidemia  -continue statin      CAD  -last [facility] 2019  -continue ASA, imdur, Statin      BPH      Anxiety  -start hydroxyzine 25mg TID PRN    Discussion with patients daughter, patient's PCP Dr. Cornejo as well as palliative Care RN Saundra Alves. Family/patient state goals are purely palliative in nature and wished to take patient home. Adamant that he not spend another night in the hospital. Hospice consulted and plans to make a visit to the patient's home Sunday 7/23. [facility] and [facility] is making necessary arrangements. Patient provided with [facility] the [facility]. Daughter aware patient's symptoms may worsen if he goes home and stops all his cardiac medications. Verbalized understanding.    Discharge Follow Up Recommendations for outpatient labs/diagnostics:   - [facility] as [facility] scheduled  - Hospice to f/u outpatient     Day of Discharge     HPI:   Patient resting in bed. He has no complaints and feels well. Would like to go home and be comfortable. No further medical treatment desired.    Review of Systems  Gen- No fevers, chills  CV- No chest pain, palpitations  Resp- No cough, dyspnea  GI- No N/V/D, abd pain    Vital Signs:   Temp:  [97.5 °F (36.4 °C)-98.2 °F (36.8 °C)] 97.5 °F (36.4 °C)  Heart Rate:  [] 109  Resp:  [20-26] 22  BP: (102-138)/() 106/74      Physical Exam:  Constitutional: No acute distress, awake, alert  HENT: NCAT, mucous membranes moist  Respiratory: Clear to auscultation bilaterally, respiratory effort normal   Cardiovascular: tachycardic, irregular, no murmurs, rubs, or gallops  Gastrointestinal: Positive bowel sounds, soft, nontender, nondistended  Musculoskeletal: No bilateral ankle edema  Psychiatric: Appropriate affect, cooperative  Neurologic: Oriented x 3, strength symmetric in all extremities, Cranial Nerves grossly intact to confrontation, speech clear  Skin: No rashes      Pertinent  and/or Most Recent Results     LAB RESULTS:      Lab 07/21/23  0548 07/20/23  1659   WBC 8.37 7.88   HEMOGLOBIN 15.5 15.3    HEMATOCRIT 47.3 47.1   PLATELETS 219 224   NEUTROS ABS 5.83 5.61   IMMATURE GRANS (ABS) 0.03 0.03   LYMPHS ABS 1.21 1.17   MONOS ABS 0.69 0.62   EOS ABS 0.54* 0.40   MCV 85.4 86.4         Lab 07/21/23  0548 07/20/23  1659   SODIUM 142 142   POTASSIUM 4.0 4.0   CHLORIDE 105 103   CO2 26.0 26.0   ANION GAP 11.0 13.0   BUN 14 13   CREATININE 1.58* 1.58*   EGFR 42.6* 42.6*   GLUCOSE 138* 132*   CALCIUM 9.3 9.2   MAGNESIUM  --  2.1   HEMOGLOBIN A1C 6.70*  --    TSH  --  2.400         Lab 07/21/23  0548 07/20/23  1659   TOTAL PROTEIN 6.3 6.4   ALBUMIN 3.7 3.8   GLOBULIN 2.6 2.6   ALT (SGPT) 10 11   AST (SGOT) 11 12   BILIRUBIN 1.0 0.8   ALK PHOS 88 88         Lab 07/20/23  1927 07/20/23  1659   PROBNP  --  2,650.0*   HSTROP T 44* 59*         Lab 07/21/23  0548   CHOLESTEROL 131   LDL CHOL 60   HDL CHOL 57   TRIGLYCERIDES 68             Brief Urine Lab Results  (Last result in the past 365 days)        Color   Clarity   Blood   Leuk Est   Nitrite   Protein   CREAT   Urine HCG        07/21/23 1018 Yellow   Clear   Negative   Negative   Negative   Trace                 Microbiology Results (last 10 days)       ** No results found for the last 240 hours. **            CT Pelvis Without Contrast    Result Date: 7/20/2023  CT PELVIS WO CONTRAST Date of Exam: 7/20/2023 5:35 PM EDT Indication: left hip pain. Comparison: None available. Technique: Axial CT images were obtained of the pelvis without contrast administration.  Reconstructed coronal and sagittal images were also obtained. Automated exposure control and iterative construction methods were used. Findings: No acute fracture or traumatic malalignment. Enlarged prostate measuring 5.5 x 5.6 cm in transverse dimension. There is a large fat-containing left inguinal hernia which extends into the left scrotum. Incidental note of left pelvic kidney with exophytic simple cyst. There is severe degenerative disc disease in the partially imaged lower lumbar spine..     Impression:  No acute fracture or traumatic malalignment. Electronically Signed: Lenny Pal  7/20/2023 6:02 PM EDT  Workstation ID: [id number]    XR Chest 1 View    Result Date: 7/20/2023  XR CHEST 1 VW Date of Exam: 7/20/2023 5:06 PM EDT Indication: Dysrhythmia triage protocol Comparison: Chest x-ray 6/1/2023 Findings: Stable cardiomediastinal silhouette with top normal size of the heart. Mild diffuse interstitial prominence is unchanged, likely chronic/senescent interstitial change. No definite pulmonary edema. Minimal streaky opacities at the left lung base likely reflect some atelectasis. No definite pleural effusion noting that the bilateral costophrenic angles are partially excluded from the field-of-view. No evidence of pneumothorax. No acute osseous findings. Severe osteoarthritic changes are noted at the bilateral glenohumeral joints.     Impression: Suspected mild streaky atelectasis at the left base. No definite acute cardiopulmonary findings. Electronically Signed: Lenny Pal  7/20/2023 5:25 PM EDT  Workstation ID: [id number]             Results for orders placed during the hospital encounter of 11/06/22    Adult Transthoracic Echo Complete W/ Cont if Necessary Per Protocol    Interpretation Summary    Left ventricular systolic function is normal. Estimated left ventricular EF = 55%    Left ventricular wall thickness is consistent with mild concentric hypertrophy.    Left atrial volume is moderately increased.    Mild to moderate mitral regurgitation.    Mild tricuspid regurgitation with normal RVSP.      Plan for Follow-up of Pending Labs/Results:     Discharge Details        Discharge Medications        ASK your doctor about these medications        Instructions Start Date   aspirin 81 MG tablet   81 mg, Oral, Daily      BENADRYL ALLERGY PO   25 mg, Oral, 2 Times Daily      doxazosin 8 MG tablet  Commonly known as: CARDURA   8 mg, Oral, Nightly      empagliflozin 10 MG tablet tablet  Commonly known as:  Jardiance   10 mg, Oral, Daily      furosemide 20 MG tablet  Commonly known as: LASIX   20 mg, Oral, Daily, TAKE ONE TABLET DAILY AS NEEDED FOR FLUID RETENTION      isosorbide mononitrate 30 MG 24 hr tablet  Commonly known as: IMDUR   TAKE 1 TABLET EVERY DAY      potassium chloride 10 MEQ CR capsule  Commonly known as: MICRO-K   TAKE 1 CAPSULE EVERY DAY (WITH LASIX) AS NEEDED      pravastatin 20 MG tablet  Commonly known as: PRAVACHOL   TAKE 1 TABLET EVERY DAY               Allergies   Allergen Reactions    Clonidine Hcl Shortness Of Breath    Atenolol Itching and Unknown (See Comments)     Patient unclear of reaction    Clonidine Derivatives     Irbesartan Swelling    Lisinopril Other (See Comments)     UNKNOWN    Nifedipine     Tetanus Toxoid GI Intolerance    Tetanus Toxoids          Discharge Disposition:  - Regular    Diet:  Hospital:  Diet Order   Procedures    Diet: Cardiac Diets; Healthy Heart (2-3 Na+); Texture: Regular Texture (IDDSI 7); Fluid Consistency: Thin (IDDSI 0)       Activity:  - as tolerated    Restrictions or Other Recommendations:  - none       CODE STATUS:    Code Status and Medical Interventions:   Ordered at: 07/20/23 2339     Medical Intervention Limits:    NO intubation (DNI)     Level Of Support Discussed With:    Patient     Code Status (Patient has no pulse and is not breathing):    No CPR (Do Not Attempt to Resuscitate)     Medical Interventions (Patient has pulse or is breathing):    Limited Support     Release to patient:    Routine Release       Future Appointments   Date Time Provider Department Center   8/4/2023 11:15 AM Chantelle Silverio MD [facility]   8/7/2023 11:30 AM Donya Sharma APRN [facility]                 Nubia Jolly DO  07/21/23      Time Spent on Discharge:  I spent  40  minutes on this discharge activity which included: face-to-face encounter with the patient, reviewing the data in the system, coordination of the care with the nursing staff as well  as consultants, documentation, and entering orders.

## 2023-07-21 NOTE — CONSULTS
Continued Stay Note  [facility]     Patient Name: Elver Montenegro  MRN: [id number]  Today's Date: 7/21/2023    Admit Date: 7/20/2023    Plan: To be determined   Discharge Plan       Row Name 07/21/23 1822       Plan    Plan Hospice to assess pt on Sunday, 7/23    Plan Comments Hospice referral received, chart reviewed. Visit made to pt with Jackie LIZARRAGA, palliative care team, pt sitting up in recliner, pt's daughter Asia present. Asia stated pt wants to go home today, does not want any further hospitalization, goal of care is comfort measures. This writer left the room to discuss the hospice referral with Dr. Corbett, upon returning to the room Asia stated received a call from the hospice home nurse stating has received a hospice referral and will make a visit to pt at home on Sunday, 7/23. [facility], sent a referral to the home hospice office earlier today. Provided Asia with the hospice 24 hr number. Plan is for pt to discharge home today. Please call [phone] if can be of further assistance.                   Discharge Codes    No documentation.                 Expected Discharge Date and Time       Expected Discharge Date Expected Discharge Time    Jul 21, 2023               Maryuri Obrien RN

## (undated) DEVICE — GW INQWIRE FC PTFE STD J/1.5 .035 260

## (undated) DEVICE — CATH DIAG EXPO .045 FL3  5F 100CM

## (undated) DEVICE — MODEL BT2000 P/N 700287-012KIT CONTENTS: MANIFOLD WITH SALINE AND CONTRAST PORTS, SALINE TUBING WITH SPIKE AND HAND SYRINGE, TRANSDUCER: Brand: BT2000 AUTOMATED MANIFOLD KIT

## (undated) DEVICE — DEV COMP RAD PRELUDESYNC 24CM

## (undated) DEVICE — CATH DIAG EXPO M/ PK 5F FL4/FR4 PIG

## (undated) DEVICE — INTRO SHEATH PRELUDE IDEAL SPRNG COIL 021 6F 23X80CM

## (undated) DEVICE — MODEL AT P65, P/N 701554-001KIT CONTENTS: HAND CONTROLLER, 3-WAY HIGH-PRESSURE STOPCOCK WITH ROTATING END AND PREMIUM HIGH-PRESSURE TUBING: Brand: ANGIOTOUCH® KIT

## (undated) DEVICE — PK CATH CARD 10